# Patient Record
Sex: FEMALE | Race: WHITE | NOT HISPANIC OR LATINO | Employment: UNEMPLOYED | URBAN - METROPOLITAN AREA
[De-identification: names, ages, dates, MRNs, and addresses within clinical notes are randomized per-mention and may not be internally consistent; named-entity substitution may affect disease eponyms.]

---

## 2021-02-11 ENCOUNTER — HISTORICAL (OUTPATIENT)
Dept: BARIATRICS | Facility: HOSPITAL | Age: 46
End: 2021-02-11

## 2021-02-11 ENCOUNTER — HISTORICAL (OUTPATIENT)
Dept: PREADMISSION TESTING | Facility: HOSPITAL | Age: 46
End: 2021-02-11

## 2021-03-16 ENCOUNTER — HISTORICAL (OUTPATIENT)
Dept: BARIATRICS | Facility: HOSPITAL | Age: 46
End: 2021-03-16

## 2021-03-17 ENCOUNTER — HISTORICAL (OUTPATIENT)
Dept: ADMINISTRATIVE | Facility: HOSPITAL | Age: 46
End: 2021-03-17

## 2021-04-20 ENCOUNTER — HISTORICAL (OUTPATIENT)
Dept: BARIATRICS | Facility: HOSPITAL | Age: 46
End: 2021-04-20

## 2021-05-19 ENCOUNTER — HISTORICAL (OUTPATIENT)
Dept: BARIATRICS | Facility: HOSPITAL | Age: 46
End: 2021-05-19

## 2021-07-06 ENCOUNTER — HISTORICAL (OUTPATIENT)
Dept: BARIATRICS | Facility: HOSPITAL | Age: 46
End: 2021-07-06

## 2021-07-20 ENCOUNTER — HISTORICAL (OUTPATIENT)
Dept: BARIATRICS | Facility: HOSPITAL | Age: 46
End: 2021-07-20

## 2021-07-26 ENCOUNTER — HISTORICAL (OUTPATIENT)
Dept: ADMINISTRATIVE | Facility: HOSPITAL | Age: 46
End: 2021-07-26

## 2021-07-26 LAB
ABS NEUT (OLG): 3.4 X10(3)/MCL (ref 2.1–9.2)
ALBUMIN SERPL-MCNC: 4.2 GM/DL (ref 3.5–5)
ALBUMIN/GLOB SERPL: 1.4 RATIO (ref 1.1–2)
ALP SERPL-CCNC: 60 UNIT/L (ref 40–150)
ALT SERPL-CCNC: 41 UNIT/L (ref 0–55)
APTT PPP: 31.4 SECOND(S) (ref 23.3–37)
AST SERPL-CCNC: 33 UNIT/L (ref 5–34)
BASOPHILS # BLD AUTO: 0 X10(3)/MCL (ref 0–0.2)
BASOPHILS NFR BLD AUTO: 0 %
BILIRUB SERPL-MCNC: 0.4 MG/DL
BILIRUBIN DIRECT+TOT PNL SERPL-MCNC: 0.2 MG/DL (ref 0–0.5)
BILIRUBIN DIRECT+TOT PNL SERPL-MCNC: 0.2 MG/DL (ref 0–0.8)
BUN SERPL-MCNC: 8.6 MG/DL (ref 7–18.7)
CALCIUM SERPL-MCNC: 9.5 MG/DL (ref 8.4–10.2)
CHLORIDE SERPL-SCNC: 103 MMOL/L (ref 98–107)
CO2 SERPL-SCNC: 28 MMOL/L (ref 22–29)
CREAT SERPL-MCNC: 0.82 MG/DL (ref 0.55–1.02)
EOSINOPHIL # BLD AUTO: 0.2 X10(3)/MCL (ref 0–0.9)
EOSINOPHIL NFR BLD AUTO: 3 %
ERYTHROCYTE [DISTWIDTH] IN BLOOD BY AUTOMATED COUNT: 16.2 % (ref 11.5–14.5)
GLOBULIN SER-MCNC: 3 GM/DL (ref 2.4–3.5)
GLUCOSE SERPL-MCNC: 78 MG/DL (ref 74–100)
HCT VFR BLD AUTO: 39.9 % (ref 35–46)
HGB BLD-MCNC: 12.6 GM/DL (ref 12–16)
IMM GRANULOCYTES # BLD AUTO: 0.01 10*3/UL
IMM GRANULOCYTES NFR BLD AUTO: 0 %
LYMPHOCYTES # BLD AUTO: 2.2 X10(3)/MCL (ref 0.6–4.6)
LYMPHOCYTES NFR BLD AUTO: 36 %
MCH RBC QN AUTO: 27 PG (ref 26–34)
MCHC RBC AUTO-ENTMCNC: 31.6 GM/DL (ref 31–37)
MCV RBC AUTO: 85.6 FL (ref 80–100)
MONOCYTES # BLD AUTO: 0.5 X10(3)/MCL (ref 0.1–1.3)
MONOCYTES NFR BLD AUTO: 7 %
NEUTROPHILS # BLD AUTO: 3.4 X10(3)/MCL (ref 2.1–9.2)
NEUTROPHILS NFR BLD AUTO: 54 %
NRBC BLD AUTO-RTO: 0 % (ref 0–0.2)
PLATELET # BLD AUTO: 275 X10(3)/MCL (ref 130–400)
PMV BLD AUTO: 10.2 FL (ref 7.4–10.4)
POTASSIUM SERPL-SCNC: 4.1 MMOL/L (ref 3.5–5.1)
PROT SERPL-MCNC: 7.2 GM/DL (ref 6.4–8.3)
RBC # BLD AUTO: 4.66 X10(6)/MCL (ref 4–5.2)
SODIUM SERPL-SCNC: 138 MMOL/L (ref 136–145)
WBC # SPEC AUTO: 6.3 X10(3)/MCL (ref 4.5–11)

## 2021-10-18 RX ORDER — OMEPRAZOLE 40 MG/1
40 CAPSULE, DELAYED RELEASE ORAL
COMMUNITY
End: 2022-07-15

## 2021-10-18 RX ORDER — LANOLIN ALCOHOL/MO/W.PET/CERES
CREAM (GRAM) TOPICAL
COMMUNITY
End: 2022-07-15

## 2021-10-18 RX ORDER — TAMSULOSIN HYDROCHLORIDE 0.4 MG/1
1 CAPSULE ORAL DAILY
COMMUNITY
Start: 2021-04-29 | End: 2022-07-15

## 2021-10-18 RX ORDER — KETOROLAC TROMETHAMINE 10 MG/1
10 TABLET, FILM COATED ORAL EVERY 6 HOURS PRN
COMMUNITY
Start: 2021-04-29 | End: 2022-07-15

## 2021-10-18 RX ORDER — CEFUROXIME AXETIL 250 MG/1
250 TABLET ORAL
COMMUNITY
End: 2022-07-11 | Stop reason: ALTCHOICE

## 2021-10-18 RX ORDER — FUROSEMIDE 20 MG/1
TABLET ORAL
COMMUNITY
End: 2022-07-15

## 2021-10-18 RX ORDER — FLUCONAZOLE 150 MG/1
TABLET ORAL
COMMUNITY
Start: 2021-07-15 | End: 2022-07-15

## 2021-10-18 RX ORDER — LEVOTHYROXINE SODIUM 88 UG/1
TABLET ORAL
Status: ON HOLD | COMMUNITY
End: 2022-07-27 | Stop reason: CLARIF

## 2021-10-18 RX ORDER — ONDANSETRON 4 MG/1
4 TABLET, ORALLY DISINTEGRATING ORAL EVERY 8 HOURS PRN
COMMUNITY
Start: 2021-04-29 | End: 2022-07-15

## 2021-10-18 RX ORDER — PREDNISONE 20 MG/1
40 TABLET ORAL DAILY
COMMUNITY
Start: 2021-08-17 | End: 2022-07-11 | Stop reason: ALTCHOICE

## 2021-10-18 RX ORDER — MEDROXYPROGESTERONE ACETATE 10 MG/1
10 TABLET ORAL
COMMUNITY
End: 2022-05-10 | Stop reason: ALTCHOICE

## 2021-10-18 RX ORDER — HYDROCODONE BITARTRATE AND ACETAMINOPHEN 7.5; 325 MG/1; MG/1
1 TABLET ORAL EVERY 6 HOURS PRN
COMMUNITY
Start: 2021-04-29 | End: 2022-07-15

## 2021-10-18 RX ORDER — CHOLECALCIFEROL (VITAMIN D3) 325 MCG
CAPSULE ORAL
COMMUNITY
End: 2022-07-15

## 2021-10-18 RX ORDER — LIFITEGRAST 50 MG/ML
SOLUTION/ DROPS OPHTHALMIC
COMMUNITY
Start: 2021-07-13 | End: 2022-07-15

## 2022-05-19 ENCOUNTER — TELEPHONE (OUTPATIENT)
Dept: BARIATRICS | Facility: HOSPITAL | Age: 47
End: 2022-05-19
Payer: MEDICAID

## 2022-05-19 NOTE — TELEPHONE ENCOUNTER
Called pt to discuss diet plan moving forward with post ponned surgery, left am message for her to call me back.

## 2022-06-28 ENCOUNTER — CLINICAL SUPPORT (OUTPATIENT)
Dept: BARIATRICS | Facility: HOSPITAL | Age: 47
End: 2022-06-28
Payer: COMMERCIAL

## 2022-06-28 NOTE — PROGRESS NOTES
Date of education: 06/28/2022  Pt education type: [x]Pre op  []Post op  Surgery date: 07/27/2022    Type of surgery: sleeve gastrectomy     Education was provided on:   [x]Importance of protein and vitamin protocol  [x]Importance of drinking  fl oz/day of non carbonated sugar free non caffeinated beverages  [x]Importance of following dietary protocol  []Importance of early ambulation postop   []Use of incentive spirometer 10 times an hour while awake  []Non opiod pain management post op   []Discontinuing use of meds containing aspirin, ibuprofen, NSAIDs, post op  []Signs and symptoms of immediate and long term complications post-op  []Prevention and signs and symptoms of blood clots   []Prevention and signs of infection  []Reviewed medication regimen  []Importance of adhering to behavioral changes  []Importance of following exercise protocol      Barriers to learning:  [x]None evident  []Acuity of illness  []Cognitive defects  []Cultural barriers  []Desire/Motivation  []Difficulty concentrating  []Emotional state  []Financial concerns  []Hearing deficit  []Language barrier  []Literacy  []Memory problems  []Vision impairment     Teaching methods:  []Demonstration  [x]Explanation  [x]Printed materials  [x]Teach back  []Virtual/web based    Expected Compliance:  [x]Good  []Fair  []Poor    Additional Notes:

## 2022-06-28 NOTE — PROGRESS NOTES
PreOp Class 6/28/2022      Surgery Date: 7/27/22     Type of Surgery: VSG     Pt was educated on   Importance of early ambulation postop within 4 hours of returning to room   Use of incentive spirometer 10 times an hour while awake  Non opiod pain management post op   Discontinuing use of meds containing aspirin, ibuprofen, NSAIDs, post op  Signs and symptoms of immediate and long term complications post-op  Prevention and signs and symptoms of blood clots   Prevention and signs of infection  Reviewed medication regimen     Pt was interactive, answered questions during in person education session.

## 2022-06-29 NOTE — PROGRESS NOTES
Date of education: 6/28/22  Pt education type: [x]Pre op  []Post op  Surgery date: 7/27/22  Type of surgery: sleeve gastrectomy     Education was provided on:   []Importance of protein and vitamin protocol  []Importance of drinking  fl oz/day of non carbonated sugar free non caffeinated beverages  []Importance of following dietary protocol  []Importance of early ambulation postop   []Use of incentive spirometer 10 times an hour while awake  []Non opiod pain management post op   []Discontinuing use of meds containing aspirin, ibuprofen, NSAIDs, post op  []Signs and symptoms of immediate and long term complications post-op  []Prevention and signs and symptoms of blood clots   []Prevention and signs of infection  []Reviewed medication regimen  [x]Importance of adhering to behavioral changes  [x]Importance of following exercise protocol      Barriers to learning:  [x]None evident  []Acuity of illness  []Cognitive defects  []Cultural barriers  []Desire/Motivation  []Difficulty concentrating  []Emotional state  []Financial concerns  []Hearing deficit  []Language barrier  []Literacy  []Memory problems  []Vision impairment     Teaching methods:  [x]Demonstration  [x]Explanation  [x]Printed materials  [x]Teach back  []Virtual/web based    Expected Compliance:  [x]Good  []Fair  []Poor    Additional Notes: A body composition and measurements were conducted. Patient was given a copy of handout.

## 2022-07-11 ENCOUNTER — ANESTHESIA EVENT (OUTPATIENT)
Dept: SURGERY | Facility: HOSPITAL | Age: 47
DRG: 621 | End: 2022-07-11
Payer: MEDICAID

## 2022-07-11 DIAGNOSIS — Z01.818 OTHER SPECIFIED PRE-OPERATIVE EXAMINATION: Primary | ICD-10-CM

## 2022-07-11 NOTE — ANESTHESIA PREPROCEDURE EVALUATION
07/11/2022  Lisette Ervin is a 46 y.o., female with PMHx of morbid obesity, JERMAINE, GERD, hypothyroidism (no meds; pt states that her blood work has been fine without meds for years), anxiety presents for laparoscopic gastric sleeve.    COVID STATUS: 7/2020 COVID +; VACCINE X2 (MODERNA, LASTLY 5/10/21);  6/14/22 COVID +,  STREP + (6/14/22 PCP w/SORE THROAT, COUGH, SINUS CONGESTION, FATIGUE; GIVEN IM ROCEPHIN, OMNICEF x 10 DAYS); NO NEED for TESTING GIVEN COVID + w/in LAST 90 DAYS    BETA-BLOCKER: NONE    PAT NURSE PHONE INTERVIEW 7/15/22    PROBLEM LIST:  -  MORBID OBESITY      - S/P 3/17/21 EGD  -  UPT STATUS, AUB (PENDING HYSTERECTOMY)  -  HYPERPROLACTINEMIA - 6/6/22 PROLACTIN=39.5  -  HYPOTHYROIDISM (PREV. On SYNTHROID) - NO TFT's on FILE  -  GERD (PREV. On OMEPRAZOLE) - ASYMPTOMATIC  -  JERMAINE - NO Tx  -  ANXIETY (NO MEDs)  -  7/26/21 GFR=80  -  LUMBAR DISC DISEASE, OA  -  Hx 10/2021 FALL/CONCUSSION w/CHRONIC POST-TRAUMA TAYLOR's, VERTIGO - STATES HAS RESOLVED  -  7/15/22 EKG=NSR w/SINUS ARRHYTHMIA, LOW VOLTAGE QRS, CANNOT R/O ANTERIOR INFRACT ?AGE (DENIES ANY CP, METS>4)    AM Rx DOS: GABAPENTIN, PROVERA    ORDERS -   SURGEON: 7/26/21 CBC, CMP, PTT; per SURGEON: 7/15/22 CBC, CMP, PTT  ANESTHESIA: ADD: 7/15/22 EKG; UPT, T&S DOS      Pre-op Assessment    I have reviewed the NPO Status.      Review of Systems  Anesthesia Hx:  No problems with previous Anesthesia    Social:  Non-Smoker    Cardiovascular:  Cardiovascular Normal     Pulmonary:   Sleep Apnea    Renal/:  Renal/ Normal     Hepatic/GI:   GERD, well controlled    Neurological:  Neurology Normal    Endocrine:   Hypothyroidism    Psych:   anxiety        Vitals:    07/27/22 0615 07/27/22 0653 07/27/22 0746 07/27/22 0814   BP: (!) 151/86  (!) 151/86 126/78   BP Location:    Right arm   Patient Position:    Lying   Pulse: 69   78   Resp:    20   Temp: 37.1 °C  (98.8 °F)   36.2 °C (97.2 °F)   TempSrc: Oral   Temporal   SpO2: 96%   100%   Weight:  125.4 kg (276 lb 7.3 oz)           Physical Exam  General: Alert, Cooperative and Well nourished    Airway:  Mallampati: III   Mouth Opening: Normal  TM Distance: Normal  Tongue: Normal  Neck ROM: Normal ROM    Dental:  Intact    Chest/Lungs:  Clear to auscultation, Normal Respiratory Rate    Heart:  Rate: Normal  Rhythm: Regular Rhythm  Sounds: Normal       Latest Reference Range & Units 07/27/22 06:56   Preg Test, Ur Negative  Negative     Lab Results   Component Value Date    WBC 8.0 07/15/2022    HGB 10.0 (L) 07/15/2022    HCT 34.2 (L) 07/15/2022    MCV 78.3 (L) 07/15/2022     07/15/2022       CMP  Sodium Level   Date Value Ref Range Status   07/15/2022 140 136 - 145 mmol/L Final     Potassium Level   Date Value Ref Range Status   07/15/2022 3.9 3.5 - 5.1 mmol/L Final     Carbon Dioxide   Date Value Ref Range Status   07/15/2022 24 22 - 29 mmol/L Final     Blood Urea Nitrogen   Date Value Ref Range Status   07/15/2022 11.5 7.0 - 18.7 mg/dL Final     Creatinine   Date Value Ref Range Status   07/15/2022 0.80 0.55 - 1.02 mg/dL Final     Calcium Level Total   Date Value Ref Range Status   07/15/2022 10.1 8.4 - 10.2 mg/dL Final     Albumin Level   Date Value Ref Range Status   07/15/2022 4.4 3.5 - 5.0 gm/dL Final     Bilirubin Total   Date Value Ref Range Status   07/15/2022 0.5 <=1.5 mg/dL Final     Alkaline Phosphatase   Date Value Ref Range Status   07/15/2022 55 40 - 150 unit/L Final     Aspartate Aminotransferase   Date Value Ref Range Status   07/15/2022 31 5 - 34 unit/L Final     Alanine Aminotransferase   Date Value Ref Range Status   07/15/2022 24 0 - 55 unit/L Final     Estimated GFR-Non    Date Value Ref Range Status   07/15/2022 >60 mls/min/1.73/m2 Final             Anesthesia Plan  Type of Anesthesia, risks & benefits discussed:    Anesthesia Type: Gen ETT  Intra-op Monitoring Plan: Standard ASA  Monitors  Post Op Pain Control Plan: multimodal analgesia  Induction:  IV  Airway Plan: Direct  Informed Consent: Informed consent signed with the Patient and all parties understand the risks and agree with anesthesia plan.  All questions answered.   ASA Score: 2  Day of Surgery Review of History & Physical: H&P Update referred to the surgeon/provider.    Ready For Surgery From Anesthesia Perspective.     .

## 2022-07-15 ENCOUNTER — CLINICAL SUPPORT (OUTPATIENT)
Dept: BARIATRICS | Facility: HOSPITAL | Age: 47
End: 2022-07-15

## 2022-07-15 ENCOUNTER — APPOINTMENT (OUTPATIENT)
Dept: PREADMISSION TESTING | Facility: HOSPITAL | Age: 47
End: 2022-07-15
Attending: SURGERY

## 2022-07-15 ENCOUNTER — CLINICAL SUPPORT (OUTPATIENT)
Dept: BARIATRICS | Facility: HOSPITAL | Age: 47
End: 2022-07-15
Payer: MEDICAID

## 2022-07-15 VITALS — BODY MASS INDEX: 43.73 KG/M2 | WEIGHT: 279.19 LBS

## 2022-07-15 LAB
ALBUMIN SERPL-MCNC: 4.4 GM/DL (ref 3.5–5)
ALBUMIN/GLOB SERPL: 1.3 RATIO (ref 1.1–2)
ALP SERPL-CCNC: 55 UNIT/L (ref 40–150)
ALT SERPL-CCNC: 24 UNIT/L (ref 0–55)
APTT PPP: 30.6 SECONDS
AST SERPL-CCNC: 31 UNIT/L (ref 5–34)
BASOPHILS # BLD AUTO: 0.03 X10(3)/MCL (ref 0–0.2)
BASOPHILS NFR BLD AUTO: 0.4 %
BILIRUBIN DIRECT+TOT PNL SERPL-MCNC: 0.5 MG/DL
BUN SERPL-MCNC: 11.5 MG/DL (ref 7–18.7)
CALCIUM SERPL-MCNC: 10.1 MG/DL (ref 8.4–10.2)
CHLORIDE SERPL-SCNC: 105 MMOL/L (ref 98–107)
CO2 SERPL-SCNC: 24 MMOL/L (ref 22–29)
CREAT SERPL-MCNC: 0.8 MG/DL (ref 0.55–1.02)
EOSINOPHIL # BLD AUTO: 0.08 X10(3)/MCL (ref 0–0.9)
EOSINOPHIL NFR BLD AUTO: 1 %
ERYTHROCYTE [DISTWIDTH] IN BLOOD BY AUTOMATED COUNT: 16.1 % (ref 11.5–17)
GLOBULIN SER-MCNC: 3.3 GM/DL (ref 2.4–3.5)
GLUCOSE SERPL-MCNC: 85 MG/DL (ref 74–100)
HCT VFR BLD AUTO: 34.2 % (ref 37–47)
HGB BLD-MCNC: 10 GM/DL (ref 12–16)
IMM GRANULOCYTES # BLD AUTO: 0.03 X10(3)/MCL (ref 0–0.04)
IMM GRANULOCYTES NFR BLD AUTO: 0.4 %
LYMPHOCYTES # BLD AUTO: 2.92 X10(3)/MCL (ref 0.6–4.6)
LYMPHOCYTES NFR BLD AUTO: 36.5 %
MCH RBC QN AUTO: 22.9 PG (ref 27–31)
MCHC RBC AUTO-ENTMCNC: 29.2 MG/DL (ref 33–36)
MCV RBC AUTO: 78.3 FL (ref 80–94)
MONOCYTES # BLD AUTO: 0.52 X10(3)/MCL (ref 0.1–1.3)
MONOCYTES NFR BLD AUTO: 6.5 %
NEUTROPHILS # BLD AUTO: 4.4 X10(3)/MCL (ref 2.1–9.2)
NEUTROPHILS NFR BLD AUTO: 55.2 %
NRBC BLD AUTO-RTO: 0 %
PLATELET # BLD AUTO: 346 X10(3)/MCL (ref 130–400)
PMV BLD AUTO: 9.3 FL (ref 7.4–10.4)
POTASSIUM SERPL-SCNC: 3.9 MMOL/L (ref 3.5–5.1)
PROT SERPL-MCNC: 7.7 GM/DL (ref 6.4–8.3)
RBC # BLD AUTO: 4.37 X10(6)/MCL (ref 4.2–5.4)
SODIUM SERPL-SCNC: 140 MMOL/L (ref 136–145)
WBC # SPEC AUTO: 8 X10(3)/MCL (ref 4.5–11.5)

## 2022-07-15 PROCEDURE — 36415 COLL VENOUS BLD VENIPUNCTURE: CPT | Performed by: SURGERY

## 2022-07-15 PROCEDURE — 93005 ELECTROCARDIOGRAM TRACING: CPT

## 2022-07-15 PROCEDURE — 80053 COMPREHEN METABOLIC PANEL: CPT | Performed by: SURGERY

## 2022-07-15 PROCEDURE — 85025 COMPLETE CBC W/AUTO DIFF WBC: CPT | Performed by: SURGERY

## 2022-07-15 PROCEDURE — 85730 THROMBOPLASTIN TIME PARTIAL: CPT | Performed by: SURGERY

## 2022-07-15 NOTE — PROGRESS NOTES
Pt here for 2 week pre op reviwed, reviewed pre op diet, T/F test and pre op questions.   Pt doing well on the pre op diet, lost 8#   Current weight at our office 279#    Pt saw PCP on 7/6- current wt there was 287# will use this to compare 1 week pre op weight with PCP.

## 2022-07-15 NOTE — PROGRESS NOTES
Patient attended preop education visit with team. Patient missed 1 on questions 13-18. Corrections were discussed. No issues noted.     ALONA Leblanc, CPT, CHC

## 2022-07-20 ENCOUNTER — CLINICAL SUPPORT (OUTPATIENT)
Dept: BARIATRICS | Facility: HOSPITAL | Age: 47
End: 2022-07-20
Payer: MEDICAID

## 2022-07-20 VITALS — WEIGHT: 279 LBS | BODY MASS INDEX: 43.7 KG/M2

## 2022-07-27 ENCOUNTER — ANESTHESIA (OUTPATIENT)
Dept: SURGERY | Facility: HOSPITAL | Age: 47
DRG: 621 | End: 2022-07-27
Payer: MEDICAID

## 2022-07-27 ENCOUNTER — HOSPITAL ENCOUNTER (INPATIENT)
Facility: HOSPITAL | Age: 47
LOS: 1 days | Discharge: HOME | DRG: 621 | End: 2022-07-28
Attending: SURGERY | Admitting: SURGERY
Payer: MEDICAID

## 2022-07-27 DIAGNOSIS — E66.01 MORBID OBESITY WITH BMI OF 40.0-44.9, ADULT: Primary | Chronic | ICD-10-CM

## 2022-07-27 DIAGNOSIS — Z01.818 OTHER SPECIFIED PRE-OPERATIVE EXAMINATION: ICD-10-CM

## 2022-07-27 DIAGNOSIS — E66.01 MORBID OBESITY: ICD-10-CM

## 2022-07-27 DIAGNOSIS — Z90.3 S/P GASTRIC SLEEVE PROCEDURE: ICD-10-CM

## 2022-07-27 PROBLEM — K44.9 HIATAL HERNIA: Status: ACTIVE | Noted: 2022-07-27

## 2022-07-27 LAB
ABORH RETYPE: NORMAL
B-HCG UR QL: NEGATIVE
CTP QC/QA: YES
GROUP & RH: NORMAL
INDIRECT COOMBS GEL: NORMAL
POCT GLUCOSE: 133 MG/DL (ref 70–110)

## 2022-07-27 PROCEDURE — 37000009 HC ANESTHESIA EA ADD 15 MINS: Performed by: SURGERY

## 2022-07-27 PROCEDURE — 63600175 PHARM REV CODE 636 W HCPCS: Performed by: NURSE PRACTITIONER

## 2022-07-27 PROCEDURE — 63600175 PHARM REV CODE 636 W HCPCS: Performed by: ANESTHESIOLOGY

## 2022-07-27 PROCEDURE — 36415 COLL VENOUS BLD VENIPUNCTURE: CPT

## 2022-07-27 PROCEDURE — 63600175 PHARM REV CODE 636 W HCPCS: Performed by: NURSE ANESTHETIST, CERTIFIED REGISTERED

## 2022-07-27 PROCEDURE — 71000033 HC RECOVERY, INTIAL HOUR: Performed by: SURGERY

## 2022-07-27 PROCEDURE — 25000003 PHARM REV CODE 250: Performed by: NURSE ANESTHETIST, CERTIFIED REGISTERED

## 2022-07-27 PROCEDURE — 25000003 PHARM REV CODE 250

## 2022-07-27 PROCEDURE — 86850 RBC ANTIBODY SCREEN: CPT | Performed by: NURSE PRACTITIONER

## 2022-07-27 PROCEDURE — 37000008 HC ANESTHESIA 1ST 15 MINUTES: Performed by: SURGERY

## 2022-07-27 PROCEDURE — 25000003 PHARM REV CODE 250: Performed by: NURSE PRACTITIONER

## 2022-07-27 PROCEDURE — 11000001 HC ACUTE MED/SURG PRIVATE ROOM

## 2022-07-27 PROCEDURE — 36000711: Performed by: SURGERY

## 2022-07-27 PROCEDURE — 81025 URINE PREGNANCY TEST: CPT | Performed by: NURSE PRACTITIONER

## 2022-07-27 PROCEDURE — 86900 BLOOD TYPING SEROLOGIC ABO: CPT | Performed by: NURSE PRACTITIONER

## 2022-07-27 PROCEDURE — 36000710: Performed by: SURGERY

## 2022-07-27 PROCEDURE — 27201423 OPTIME MED/SURG SUP & DEVICES STERILE SUPPLY: Performed by: SURGERY

## 2022-07-27 PROCEDURE — 63600175 PHARM REV CODE 636 W HCPCS

## 2022-07-27 RX ORDER — MIDAZOLAM HYDROCHLORIDE 1 MG/ML
2 INJECTION INTRAMUSCULAR; INTRAVENOUS ONCE AS NEEDED
Status: COMPLETED | OUTPATIENT
Start: 2022-07-27 | End: 2022-07-27

## 2022-07-27 RX ORDER — ONDANSETRON 2 MG/ML
4 INJECTION INTRAMUSCULAR; INTRAVENOUS EVERY 4 HOURS PRN
Status: ACTIVE | OUTPATIENT
Start: 2022-07-27

## 2022-07-27 RX ORDER — MORPHINE SULFATE 2 MG/ML
INJECTION, SOLUTION INTRAMUSCULAR; INTRAVENOUS
Status: COMPLETED
Start: 2022-07-27 | End: 2022-07-27

## 2022-07-27 RX ORDER — ONDANSETRON 4 MG/1
4 TABLET, ORALLY DISINTEGRATING ORAL EVERY 6 HOURS PRN
Status: ACTIVE | OUTPATIENT
Start: 2022-07-27

## 2022-07-27 RX ORDER — MEDROXYPROGESTERONE ACETATE 10 MG/1
20 TABLET ORAL DAILY
Status: DISCONTINUED | OUTPATIENT
Start: 2022-07-28 | End: 2022-07-28 | Stop reason: HOSPADM

## 2022-07-27 RX ORDER — ONDANSETRON 4 MG/1
4 TABLET, ORALLY DISINTEGRATING ORAL EVERY 6 HOURS PRN
Qty: 12 TABLET | Refills: 1 | Status: SHIPPED | OUTPATIENT
Start: 2022-07-27

## 2022-07-27 RX ORDER — CEFAZOLIN SODIUM 1 G/3ML
INJECTION, POWDER, FOR SOLUTION INTRAMUSCULAR; INTRAVENOUS
Status: DISCONTINUED | OUTPATIENT
Start: 2022-07-27 | End: 2022-07-27

## 2022-07-27 RX ORDER — SODIUM CHLORIDE, SODIUM LACTATE, POTASSIUM CHLORIDE, CALCIUM CHLORIDE 600; 310; 30; 20 MG/100ML; MG/100ML; MG/100ML; MG/100ML
INJECTION, SOLUTION INTRAVENOUS CONTINUOUS
Status: DISCONTINUED | OUTPATIENT
Start: 2022-07-27 | End: 2022-07-28

## 2022-07-27 RX ORDER — PROMETHAZINE HYDROCHLORIDE 12.5 MG/1
12.5 TABLET ORAL EVERY 6 HOURS PRN
Qty: 12 TABLET | Refills: 1 | Status: SHIPPED | OUTPATIENT
Start: 2022-07-27

## 2022-07-27 RX ORDER — ROCURONIUM BROMIDE 10 MG/ML
INJECTION, SOLUTION INTRAVENOUS
Status: DISCONTINUED | OUTPATIENT
Start: 2022-07-27 | End: 2022-07-27

## 2022-07-27 RX ORDER — PANTOPRAZOLE SODIUM 40 MG/1
40 TABLET, DELAYED RELEASE ORAL DAILY
Status: DISPENSED | OUTPATIENT
Start: 2022-07-27

## 2022-07-27 RX ORDER — BUPIVACAINE HYDROCHLORIDE 2.5 MG/ML
INJECTION, SOLUTION EPIDURAL; INFILTRATION; INTRACAUDAL
Status: DISCONTINUED | OUTPATIENT
Start: 2022-07-27 | End: 2022-07-27 | Stop reason: HOSPADM

## 2022-07-27 RX ORDER — MIDAZOLAM HYDROCHLORIDE 1 MG/ML
INJECTION INTRAMUSCULAR; INTRAVENOUS
Status: DISPENSED
Start: 2022-07-27 | End: 2022-07-27

## 2022-07-27 RX ORDER — MORPHINE SULFATE 2 MG/ML
INJECTION, SOLUTION INTRAMUSCULAR; INTRAVENOUS
Status: DISPENSED
Start: 2022-07-27 | End: 2022-07-27

## 2022-07-27 RX ORDER — LABETALOL HCL 20 MG/4 ML
SYRINGE (ML) INTRAVENOUS
Status: DISPENSED
Start: 2022-07-27 | End: 2022-07-27

## 2022-07-27 RX ORDER — LABETALOL HYDROCHLORIDE 5 MG/ML
10 INJECTION, SOLUTION INTRAVENOUS
Status: COMPLETED | OUTPATIENT
Start: 2022-07-27 | End: 2022-07-27

## 2022-07-27 RX ORDER — GABAPENTIN 300 MG/1
600 CAPSULE ORAL
Status: COMPLETED | OUTPATIENT
Start: 2022-07-27 | End: 2022-07-27

## 2022-07-27 RX ORDER — CLONIDINE 0.1 MG/24H
1 PATCH, EXTENDED RELEASE TRANSDERMAL ONCE AS NEEDED
Status: DISPENSED | OUTPATIENT
Start: 2022-07-27 | End: 2033-12-22

## 2022-07-27 RX ORDER — PROPOFOL 10 MG/ML
VIAL (ML) INTRAVENOUS
Status: DISCONTINUED | OUTPATIENT
Start: 2022-07-27 | End: 2022-07-27

## 2022-07-27 RX ORDER — MEPERIDINE HYDROCHLORIDE 25 MG/ML
12.5 INJECTION INTRAMUSCULAR; INTRAVENOUS; SUBCUTANEOUS EVERY 10 MIN PRN
Status: DISCONTINUED | OUTPATIENT
Start: 2022-07-27 | End: 2022-07-27

## 2022-07-27 RX ORDER — ONDANSETRON 2 MG/ML
4 INJECTION INTRAMUSCULAR; INTRAVENOUS DAILY PRN
Status: DISCONTINUED | OUTPATIENT
Start: 2022-07-27 | End: 2022-07-27

## 2022-07-27 RX ORDER — ENOXAPARIN SODIUM 100 MG/ML
40 INJECTION SUBCUTANEOUS
Status: COMPLETED | OUTPATIENT
Start: 2022-07-27 | End: 2022-07-27

## 2022-07-27 RX ORDER — ONDANSETRON 2 MG/ML
INJECTION INTRAMUSCULAR; INTRAVENOUS
Status: DISCONTINUED | OUTPATIENT
Start: 2022-07-27 | End: 2022-07-27

## 2022-07-27 RX ORDER — LIDOCAINE HYDROCHLORIDE 10 MG/ML
1 INJECTION, SOLUTION EPIDURAL; INFILTRATION; INTRACAUDAL; PERINEURAL ONCE
Status: ACTIVE | OUTPATIENT
Start: 2022-07-27

## 2022-07-27 RX ORDER — CELECOXIB 200 MG/1
200 CAPSULE ORAL
Status: COMPLETED | OUTPATIENT
Start: 2022-07-27 | End: 2022-07-27

## 2022-07-27 RX ORDER — DEXAMETHASONE SODIUM PHOSPHATE 4 MG/ML
INJECTION, SOLUTION INTRA-ARTICULAR; INTRALESIONAL; INTRAMUSCULAR; INTRAVENOUS; SOFT TISSUE
Status: DISCONTINUED | OUTPATIENT
Start: 2022-07-27 | End: 2022-07-27

## 2022-07-27 RX ORDER — LABETALOL HYDROCHLORIDE 5 MG/ML
INJECTION, SOLUTION INTRAVENOUS
Status: DISCONTINUED | OUTPATIENT
Start: 2022-07-27 | End: 2022-07-27

## 2022-07-27 RX ORDER — BUPIVACAINE HYDROCHLORIDE 2.5 MG/ML
INJECTION, SOLUTION EPIDURAL; INFILTRATION; INTRACAUDAL
Status: DISPENSED
Start: 2022-07-27 | End: 2022-07-27

## 2022-07-27 RX ORDER — KETOROLAC TROMETHAMINE 30 MG/ML
INJECTION, SOLUTION INTRAMUSCULAR; INTRAVENOUS
Status: COMPLETED
Start: 2022-07-27 | End: 2022-07-27

## 2022-07-27 RX ORDER — PANTOPRAZOLE SODIUM 40 MG/1
40 TABLET, DELAYED RELEASE ORAL DAILY
Qty: 30 TABLET | Refills: 2 | Status: SHIPPED | OUTPATIENT
Start: 2022-07-27

## 2022-07-27 RX ORDER — FENTANYL CITRATE 50 UG/ML
INJECTION, SOLUTION INTRAMUSCULAR; INTRAVENOUS
Status: DISCONTINUED | OUTPATIENT
Start: 2022-07-27 | End: 2022-07-27

## 2022-07-27 RX ORDER — ACETAMINOPHEN 160 MG/5ML
1000 SOLUTION ORAL
Status: COMPLETED | OUTPATIENT
Start: 2022-07-27 | End: 2022-07-27

## 2022-07-27 RX ORDER — ACETAMINOPHEN 500 MG
1000 TABLET ORAL EVERY 8 HOURS
Status: DISCONTINUED | OUTPATIENT
Start: 2022-07-28 | End: 2022-07-28

## 2022-07-27 RX ORDER — ENOXAPARIN SODIUM 100 MG/ML
40 INJECTION SUBCUTANEOUS DAILY
Status: DISCONTINUED | OUTPATIENT
Start: 2022-07-28 | End: 2022-07-28 | Stop reason: HOSPADM

## 2022-07-27 RX ORDER — MEPERIDINE HYDROCHLORIDE 25 MG/ML
INJECTION INTRAMUSCULAR; INTRAVENOUS; SUBCUTANEOUS
Status: DISPENSED
Start: 2022-07-27 | End: 2022-07-27

## 2022-07-27 RX ORDER — ENOXAPARIN SODIUM 100 MG/ML
40 INJECTION SUBCUTANEOUS EVERY 24 HOURS
Status: DISCONTINUED | OUTPATIENT
Start: 2022-07-28 | End: 2022-07-27

## 2022-07-27 RX ORDER — GABAPENTIN 300 MG/1
300 CAPSULE ORAL 2 TIMES DAILY
Status: DISCONTINUED | OUTPATIENT
Start: 2022-07-28 | End: 2022-07-28 | Stop reason: HOSPADM

## 2022-07-27 RX ORDER — ONDANSETRON 4 MG/1
4 TABLET, ORALLY DISINTEGRATING ORAL
Status: COMPLETED | OUTPATIENT
Start: 2022-07-27 | End: 2022-07-27

## 2022-07-27 RX ORDER — KETOROLAC TROMETHAMINE 10 MG/1
10 TABLET, FILM COATED ORAL EVERY 6 HOURS PRN
Qty: 12 TABLET | Refills: 0 | Status: SHIPPED | OUTPATIENT
Start: 2022-07-27 | End: 2022-07-30

## 2022-07-27 RX ORDER — PROMETHAZINE HYDROCHLORIDE 25 MG/ML
12.5 INJECTION, SOLUTION INTRAMUSCULAR; INTRAVENOUS
Status: ACTIVE | OUTPATIENT
Start: 2022-07-27

## 2022-07-27 RX ORDER — CEFAZOLIN SODIUM 2 G/50ML
2 SOLUTION INTRAVENOUS
Status: ACTIVE | OUTPATIENT
Start: 2022-07-27

## 2022-07-27 RX ORDER — ACETAMINOPHEN 500 MG
1000 TABLET ORAL EVERY 6 HOURS PRN
Qty: 50 TABLET | Refills: 0 | Status: SHIPPED | OUTPATIENT
Start: 2022-07-27

## 2022-07-27 RX ORDER — PHENYLEPHRINE HYDROCHLORIDE 10 MG/ML
INJECTION INTRAVENOUS
Status: DISCONTINUED | OUTPATIENT
Start: 2022-07-27 | End: 2022-07-27

## 2022-07-27 RX ORDER — TRAMADOL HYDROCHLORIDE 50 MG/1
100 TABLET ORAL EVERY 6 HOURS PRN
Status: ACTIVE | OUTPATIENT
Start: 2022-07-27

## 2022-07-27 RX ORDER — OXYCODONE HYDROCHLORIDE 5 MG/1
5 TABLET ORAL
Status: DISCONTINUED | OUTPATIENT
Start: 2022-07-27 | End: 2022-07-28 | Stop reason: HOSPADM

## 2022-07-27 RX ORDER — KETOROLAC TROMETHAMINE 30 MG/ML
30 INJECTION, SOLUTION INTRAMUSCULAR; INTRAVENOUS EVERY 6 HOURS
Status: DISPENSED | OUTPATIENT
Start: 2022-07-27 | End: 2022-07-30

## 2022-07-27 RX ORDER — PROCHLORPERAZINE EDISYLATE 5 MG/ML
5 INJECTION INTRAMUSCULAR; INTRAVENOUS EVERY 6 HOURS PRN
Status: DISPENSED | OUTPATIENT
Start: 2022-07-27

## 2022-07-27 RX ORDER — LIDOCAINE HYDROCHLORIDE 20 MG/ML
INJECTION INTRAVENOUS
Status: DISCONTINUED | OUTPATIENT
Start: 2022-07-27 | End: 2022-07-27

## 2022-07-27 RX ORDER — ACETAMINOPHEN 10 MG/ML
1000 INJECTION, SOLUTION INTRAVENOUS EVERY 8 HOURS
Status: DISCONTINUED | OUTPATIENT
Start: 2022-07-27 | End: 2022-07-28

## 2022-07-27 RX ORDER — MORPHINE SULFATE 2 MG/ML
2 INJECTION, SOLUTION INTRAMUSCULAR; INTRAVENOUS EVERY 5 MIN PRN
Status: COMPLETED | OUTPATIENT
Start: 2022-07-27 | End: 2022-07-27

## 2022-07-27 RX ADMIN — GABAPENTIN 600 MG: 300 CAPSULE ORAL at 07:07

## 2022-07-27 RX ADMIN — PHENYLEPHRINE HYDROCHLORIDE 100 MCG: 10 INJECTION INTRAVENOUS at 09:07

## 2022-07-27 RX ADMIN — SODIUM CHLORIDE, POTASSIUM CHLORIDE, SODIUM LACTATE AND CALCIUM CHLORIDE: 600; 310; 30; 20 INJECTION, SOLUTION INTRAVENOUS at 09:07

## 2022-07-27 RX ADMIN — ACETAMINOPHEN 1001.6 MG: 160 SOLUTION ORAL at 07:07

## 2022-07-27 RX ADMIN — MIDAZOLAM HYDROCHLORIDE 2 MG: 1 INJECTION, SOLUTION INTRAMUSCULAR; INTRAVENOUS at 08:07

## 2022-07-27 RX ADMIN — SODIUM CHLORIDE, POTASSIUM CHLORIDE, SODIUM LACTATE AND CALCIUM CHLORIDE: 600; 310; 30; 20 INJECTION, SOLUTION INTRAVENOUS at 08:07

## 2022-07-27 RX ADMIN — LABETALOL HYDROCHLORIDE 10 MG: 5 INJECTION, SOLUTION INTRAVENOUS at 10:07

## 2022-07-27 RX ADMIN — CEFAZOLIN 2 G: 330 INJECTION, POWDER, FOR SOLUTION INTRAMUSCULAR; INTRAVENOUS at 09:07

## 2022-07-27 RX ADMIN — CELECOXIB 200 MG: 200 CAPSULE ORAL at 07:07

## 2022-07-27 RX ADMIN — LIDOCAINE HYDROCHLORIDE 50 MG: 20 INJECTION, SOLUTION INTRAVENOUS at 09:07

## 2022-07-27 RX ADMIN — MORPHINE SULFATE 2 MG: 2 INJECTION, SOLUTION INTRAMUSCULAR; INTRAVENOUS at 11:07

## 2022-07-27 RX ADMIN — DEXAMETHASONE SODIUM PHOSPHATE 8 MG: 4 INJECTION, SOLUTION INTRA-ARTICULAR; INTRALESIONAL; INTRAMUSCULAR; INTRAVENOUS; SOFT TISSUE at 09:07

## 2022-07-27 RX ADMIN — ROCURONIUM BROMIDE 50 MG: 10 SOLUTION INTRAVENOUS at 09:07

## 2022-07-27 RX ADMIN — PROPOFOL 150 MG: 10 INJECTION, EMULSION INTRAVENOUS at 09:07

## 2022-07-27 RX ADMIN — FENTANYL CITRATE 100 MCG: 50 INJECTION, SOLUTION INTRAMUSCULAR; INTRAVENOUS at 09:07

## 2022-07-27 RX ADMIN — SODIUM CHLORIDE 2 G: 9 INJECTION, SOLUTION INTRAVENOUS at 05:07

## 2022-07-27 RX ADMIN — MEPERIDINE HYDROCHLORIDE 12.5 MG: 25 INJECTION INTRAMUSCULAR; INTRAVENOUS; SUBCUTANEOUS at 11:07

## 2022-07-27 RX ADMIN — LABETALOL HYDROCHLORIDE 10 MG: 5 INJECTION, SOLUTION INTRAVENOUS at 11:07

## 2022-07-27 RX ADMIN — KETOROLAC TROMETHAMINE 30 MG: 30 INJECTION, SOLUTION INTRAMUSCULAR at 11:07

## 2022-07-27 RX ADMIN — SODIUM CHLORIDE, POTASSIUM CHLORIDE, SODIUM LACTATE AND CALCIUM CHLORIDE: 600; 310; 30; 20 INJECTION, SOLUTION INTRAVENOUS at 02:07

## 2022-07-27 RX ADMIN — SUGAMMADEX 400 MG: 100 INJECTION, SOLUTION INTRAVENOUS at 10:07

## 2022-07-27 RX ADMIN — ACETAMINOPHEN 1000 MG: 10 INJECTION, SOLUTION INTRAVENOUS at 04:07

## 2022-07-27 RX ADMIN — ACETAMINOPHEN 1000 MG: 10 INJECTION, SOLUTION INTRAVENOUS at 10:07

## 2022-07-27 RX ADMIN — ONDANSETRON 4 MG: 2 INJECTION INTRAMUSCULAR; INTRAVENOUS at 10:07

## 2022-07-27 RX ADMIN — KETOROLAC TROMETHAMINE 30 MG: 30 INJECTION, SOLUTION INTRAMUSCULAR; INTRAVENOUS at 05:07

## 2022-07-27 RX ADMIN — LABETALOL HYDROCHLORIDE 5 MG: 5 INJECTION, SOLUTION INTRAVENOUS at 10:07

## 2022-07-27 RX ADMIN — ENOXAPARIN SODIUM 40 MG: 40 INJECTION SUBCUTANEOUS at 08:07

## 2022-07-27 RX ADMIN — KETOROLAC TROMETHAMINE 30 MG: 30 INJECTION, SOLUTION INTRAMUSCULAR; INTRAVENOUS at 11:07

## 2022-07-27 RX ADMIN — ONDANSETRON 4 MG: 4 TABLET, ORALLY DISINTEGRATING ORAL at 07:07

## 2022-07-27 RX ADMIN — SUGAMMADEX 10 MG: 100 INJECTION, SOLUTION INTRAVENOUS at 10:07

## 2022-07-27 NOTE — ANESTHESIA POSTPROCEDURE EVALUATION
Anesthesia Post Evaluation    Patient: Lisette Ervin    Procedure(s) Performed: Procedure(s) (LRB):  GASTRECTOMY, SLEEVE, LAPAROSCOPIC (N/A)  REPAIR, HERNIA, HIATAL, LAPAROSCOPIC (N/A)    Final Anesthesia Type: general      Patient location during evaluation: floor  Post-procedure vital signs: reviewed and stable  Airway patency: patent      Anesthetic complications: no      Cardiovascular status: hemodynamically stable  Respiratory status: spontaneous ventilation  Follow-up not needed.          Vitals Value Taken Time   /78 07/27/22 1140   Temp 36.6 °C (97.9 °F) 07/27/22 1140   Pulse 60 07/27/22 1140   Resp 18 07/27/22 1140   SpO2 97 % 07/27/22 1140         Event Time   Out of Recovery 11:45:00         Pain/Mohan Score: Pain Rating Prior to Med Admin: 8 (7/27/2022 11:33 AM)  Pain Rating Post Med Admin: 0 (7/27/2022 11:40 AM)  Mohan Score: 9 (7/27/2022 11:40 AM)

## 2022-07-27 NOTE — H&P
History & Physical    SUBJECTIVE:     History of Present Illness:  Patient is a 46 y.o. female presents to [facility] for elective sleeve gastrectomy. No changes to medical history. Pt ready to proceed.     No chief complaint on file.      Review of patient's allergies indicates:   Allergen Reactions    Ketamine Hallucinations     Other reaction(s): Anxiety    Doxycycline Nausea And Vomiting    Gatifloxacin Itching and Rash    Moxifloxacin Itching and Rash       Current Facility-Administered Medications   Medication Dose Route Frequency Provider Last Rate Last Admin    cefazolin (ANCEF) 2 gram in dextrose 5% 50 mL IVPB (premix)  2 g Intravenous On Call Procedure Mela E Teran, FNP        lactated ringers infusion   Intravenous Continuous Mela E Teran,  mL/hr at 07/27/22 0821 New Bag at 07/27/22 0821    lactated ringers infusion   Intravenous Continuous Muna Odell MD        LIDOcaine (PF) 10 mg/ml (1%) injection 10 mg  1 mL Intradermal Once Chantal Clancy, FNJOSE        midazolam (VERSED) 1 mg/mL injection                Past Medical History:   Diagnosis Date    Abnormal weight gain     Concussion     Hypothyroidism     Insulin resistance     Irritable bowel syndrome     Malaise and fatigue     Vitamin B 12 deficiency     Vitamin deficiency      Past Surgical History:   Procedure Laterality Date    CHOLECYSTECTOMY      CYSTOSCOPY      TONSILLECTOMY       Family History   Problem Relation Age of Onset    Diabetes type II Mother     Heart disease Father     Diabetes type II Father     Lung cancer Maternal Grandmother     Diabetes type II Maternal Grandmother     Lung cancer Maternal Grandfather     Stroke Paternal Grandmother     Heart attack Paternal Grandmother     Colon cancer Paternal Grandfather     Heart attack Paternal Grandfather     Throat cancer Other     Bladder Cancer Other     Colon cancer Other     Lung cancer Other      Social History     Tobacco Use     Smoking status: Never Smoker    Smokeless tobacco: Never Used   Substance Use Topics    Alcohol use: Not Currently     Alcohol/week: 2.0 standard drinks     Types: 1 Shots of liquor, 1 Cans of beer per week     Comment: occasionally    Drug use: Never        Review of Systems:   All ROS negative.     OBJECTIVE:     Vital Signs (Most Recent)  Temp: 97.2 °F (36.2 °C) (07/27/22 0814)  Pulse: 78 (07/27/22 0814)  Resp: 20 (07/27/22 0814)  BP: 126/78 (07/27/22 0814)  SpO2: 100 % (07/27/22 0814)     125.4 kg (276 lb 7.3 oz)     Physical Exam:    Physical Exam  General:  Well nourished and Obesity         Chest/Lungs:   CTA bilaterally   Heart/Vascular:  RRR   Abdomen:  Soft, NT, obese     Mental Status:  Cooperative, alert, NADN, no focal deficits       Laboratory  All pre op labs reviewed    Diagnostic Results:  Impression: Morbid Obesity, BMI 43.3    ASSESSMENT/PLAN:         PLAN:Plan   Laparoscopic sleeve gastrectomy  Dr. Crowley examined patient, obtained informed consent, and answered all questions.

## 2022-07-27 NOTE — DISCHARGE INSTRUCTIONS
HOSPITAL DISCHARGE INSTRUCTIONS    Clinic Phone Numbers       Surgeons office number and after hours on call surgeon: [phone].    ALWAYS call the surgeons office PRIOR to going to an Urgent Care or the emergency room. If medical emergency, call 911.     Signs and Symptoms that would warrant a phone call of the office (regardless of the time of day):     Fever greater than 101 F     Uncontrolled pain that does not improve with pain medication     Uncontrolled nausea that does not improve with nausea medication      Vomiting     Shortness of breath     Chest Pain     Foul smelling drainage from incision and/or yellow or green drainage from incision     Red, hot painful incisions     Bloody bowel movements     ** If you feel as though it is a life-threatening emergency, call 911 and go to the emergency room**          Prescriptions     Medications     Pain medication (if needed) Tylenol over the counter is safe to take for discomfort     Anti-nausea medication (if needed)     Proton Pump Inhibitor (finish all 30 days), call [phone] if you did not receive 30 days of medication       Supplements     Chewable multivitamin- take 2 times a day (unless otherwise directed)     Chewable Calcium Citrate with D3- take 3 times a day (unless otherwise directed)     Iron tablet- take once daily      MiraLAX- take once daily for 2 weeks       Home Medications     Please review your medication list that you received at pre-op class as well as at the hospital instructions upon discharge to assure you are resuming all medications that are deemed  safe after surgery.      ** REMINDER- You should have scheduled your follow-up with your prescribing doctor for 1-week post-op**          Appointments     Surgeons Post-op Visits- please review orange sheet you received in the mail after surgery. Call [phone] if you need to reschedule your surgeons post-op visit.      Bariatric Team Post-op Visits- please review blue sheet  you received in your e-mail or please reference [facility] for your post-op appointments. . Call [phone] option 1 if you need to reschedule your bariatric team post-op visit.          Nutritional Considerations     Hydration is CRITICAL!     Daily fluid intake of  oz water     Water is more important than protein      Review list of allowable and non-allowable liquids in your Bariatric Booklet    The only fluids that count towards your water goal is water, sugar free, caffeine free flavored water        Diet progression          Continue the dietary protocol until you meet with the dietitian at your 2-week visit     Strive to reach protein goal. Only liquids counted toward your protein goal are protein shake, milk and approved yogurt     It is MANDATORY that you do not progress your diet without speaking to the dietitian to prevent potential complications          Incisional Care     Wash your hands before you touch your incisions or dressings     Remove any gauze or dressing over your incision. ONLY steri-strips (butter-fly band aids) should remain over your incision     Shower daily with an anti-bacterial soap (Hibiclens or Dial, orange bar).      Allow water to hit your back in the shower     Wet the incisions with water     Apply soap to a clean washcloth and wash over your incisions (do not scrub)     Rinse your incision with water and pat dry with a clean towel      Check your incisions daily for any redness, swelling, hot to touch, or bright red, green or yellow drainage.             Dark red, dried blood, indention of an incision, bruising may appear under the steri-strips. This is normal.     Do not apply any creams, ointments, etc. on the incisions.      Leave incision open to air (unless instructed otherwise)          Activity     Walk and/or ride a stationary bike 20 minutes a day     Do not lift, pull or push anything greater than 10 pounds for 4-6 weeks     Do not go the gym until you are  4-6 weeks post-op     Use your incentive spirometer (breathing machine) 10 x an hour for 1-2 weeks     Shower daily, DO NOT submerge yourself in water until 2 weeks post-op and cleared by surgeon          Post-Operative Expectations     A soreness is to be expected. Pain differs for everyone. Please refer to the pain scale and list of when to call the doctor regarding pain.     Nausea can last a few weeks after surgery due to the body getting adjusted to the new small stomach. Take anti-nausea as needed and stay HYDRATED. Slight dehydration will cause nausea.     Constipation is common after surgery. Take MiraLAX daily even if your bowel movements are regular. Please refer to the FAQs regarding constipation. Water is essential in preventing constipation.

## 2022-07-27 NOTE — ANESTHESIA PROCEDURE NOTES
Intubation    Date/Time: 7/27/2022 9:22 AM  Performed by: Marta Lundberg CRNA  Authorized by: Muna Odell MD     Intubation:     Induction:  Intravenous    Intubated:  Postinduction    Mask Ventilation:  Easy mask    Attempts:  1    Attempted By:  CRNA    Method of Intubation:  Direct    Blade:  Castaneda 2    Difficult Airway Encountered?: No      Complications:  None    Airway Device:  Oral endotracheal tube    Airway Device Size:  7.5    Style/Cuff Inflation:  Cuffed (inflated to minimal occlusive pressure)    Inflation Amount (mL):  5    Tube secured:  21    Secured at:  The lips    Placement Verified By:  Capnometry    Complicating Factors:  Obesity    Findings Post-Intubation:  BS equal bilateral and atraumatic/condition of teeth unchanged

## 2022-07-27 NOTE — OP NOTE
[facility]  [address] [address]  Phone: [phone]   Surgery Department  Operative Note    SUMMARY     Date of Procedure: 7/27/2022     Procedure: Procedure(s) (LRB):  GASTRECTOMY, SLEEVE, LAPAROSCOPIC (N/A)  REPAIR, HERNIA, HIATAL, LAPAROSCOPIC (N/A)     Surgeon(s) and Role:     * William Crowley MD - Primary     * Gary Byrne MD - Resident - Assisting     MEMD@     Pre-Operative Diagnosis: Morbid Obesity    Post-Operative Diagnosis: Post-Op Diagnosis Codes:     * Morbid obesity [E66.01]  Hiatal hernia     Anesthesia: General    Operative Findings (including complications, if any): dictated per attending surgeon      Estimated Blood Loss (EBL): 30 cc    Specimens:   Specimen (24h ago, onward)             Start     Ordered    07/27/22 0957  Specimen to Pathology  RELEASE UPON ORDERING        References:    Click here for ordering Quick Tip   Question:  Release to patient  Answer:  Immediate    07/27/22 0957                        Condition: Stable    Disposition: PACU - hemodynamically stable.

## 2022-07-27 NOTE — TRANSFER OF CARE
Anesthesia Transfer of Care Note    Patient: Lisette Ervin    Procedure(s) Performed: Procedure(s) (LRB):  GASTRECTOMY, SLEEVE, LAPAROSCOPIC (N/A)  REPAIR, HERNIA, HIATAL, LAPAROSCOPIC (N/A)    Patient location: PACU    Anesthesia Type: general    Transport from OR: Transported from OR on room air with adequate spontaneous ventilation    Post pain: adequate analgesia    Post assessment: no apparent anesthetic complications    Post vital signs: stable    Level of consciousness: sedated    Nausea/Vomiting: no nausea/vomiting    Complications: none    Transfer of care protocol was followed    Hypertensive - see PACU VS. Labetalol IVP

## 2022-07-27 NOTE — OP NOTE
Operation: Laparoscopic Vertical sleeve gastrectomy and hiatal hernia repair    Indications:  This is a 46-year-old female who has undergone a comprehensive medical and psychological workup in preparation for today's bariatric operation.  She has been educated for appropriate preoperative and postoperative care to help ensure the safest an optimal result.    Preoperative diagnosis:  Morbid obesity, hiatal hernia    Postoperative diagnosis:  Same    Anesthesia:  General endotracheal    Blood loss:  5 cc    Complications:  None    Specimens:  Portion of stomach    Findings:  2 cm hiatal hernia    Surgeon: William Crowley MD  Assistant : Sampson Byrne MD Chief surgery resident, Latasha Bah NP    Operation details:  Patient was brought to the operating room and laid supine on the operating room table.  General anesthesia was administered and she was intubated endotracheally.  The abdomen was prepped and draped in the usual sterile fashion.    15 cm inferior to the xiphoid pubis and 5 cm left of midline an 11 mm incision was made and a Visiport was inserted under direct vision.  Pneumoperitoneum was achieved and there was no injury to intra-abdominal structures during the placement of this port.    A 5 mm working port was placed in the right upper quadrant along with a 15 mm port.  Two 5 mm ports were placed in the left upper quadrant and a 5 mm incision was made in the epigastrium through which a liver retractor was advanced and used to elevate the liver anteriorly exposing the hiatus in the anterior upper stomach.    There was a 2 cm hiatal hernia, the Harmonic device was used to take down the phrenoesophageal membrane and expose the right and left crura.    A Harmonic device was used to remove the short gastrics and gastroepiploic vessels from the greater curvature of the stomach beginning 6 cm proximal to the pylorus working all the way up to the angle of his.  A 34 Polish blunt-tip bougie was placed down the  esophagus into the stomach with the tip position at the pylorus.    A single 0 Ethibond suture was used to close the hiatal hernia defect over the bougie and secured with the tie knot reapproximating the right and left crura in an anterior fashion, repairing the hiatal hernia defect.    Sequential firings of green and then blue Endo-VEENA staple cartridges were used to complete the vertical sleeve gastrectomy alongside the 34 Romanian bougie beginning 6 cm proximal to the pylorus going all the way to the ligament of Treitz.  Fibrin glue was used as a hemostatic agent along the suture line, the stomach was retrieved through the 15 mm port site and sent to pathology as specimen.    A trans abdominis preperitoneal block was performed using 30 cc of 0.25 Marcaine in the preperitoneal plane bilaterally for postoperative analgesia.    The liver retractor was removed and the 15 mm incision site correction the 15 mm fascial incision site was closed with a 0 Vicryl with the aid of a laparoscopic needle Passer.    All skin incisions were closed with 4-0 subcuticular Vicryl sutures after the 5 mm and 15 and 11 mm ports were removed under direct vision sterile dressings were applied patient tolerated procedure well anesthesia placed extubate the patient in the PACU she remained in stable satisfactory condition for the duration of the operation

## 2022-07-28 VITALS
HEART RATE: 74 BPM | OXYGEN SATURATION: 99 % | BODY MASS INDEX: 43.32 KG/M2 | DIASTOLIC BLOOD PRESSURE: 65 MMHG | SYSTOLIC BLOOD PRESSURE: 119 MMHG | HEIGHT: 67 IN | RESPIRATION RATE: 18 BRPM | WEIGHT: 276 LBS | TEMPERATURE: 98 F

## 2022-07-28 LAB
ANION GAP SERPL CALC-SCNC: 11 MEQ/L
BASOPHILS # BLD AUTO: 0.02 X10(3)/MCL (ref 0–0.2)
BASOPHILS NFR BLD AUTO: 0.2 %
BUN SERPL-MCNC: 8.5 MG/DL (ref 7–18.7)
CALCIUM SERPL-MCNC: 9.4 MG/DL (ref 8.4–10.2)
CHLORIDE SERPL-SCNC: 106 MMOL/L (ref 98–107)
CO2 SERPL-SCNC: 24 MMOL/L (ref 22–29)
CREAT SERPL-MCNC: 0.79 MG/DL (ref 0.55–1.02)
CREAT/UREA NIT SERPL: 11
EOSINOPHIL # BLD AUTO: 0.01 X10(3)/MCL (ref 0–0.9)
EOSINOPHIL NFR BLD AUTO: 0.1 %
ERYTHROCYTE [DISTWIDTH] IN BLOOD BY AUTOMATED COUNT: 17.2 % (ref 11.5–17)
ESTROGEN SERPL-MCNC: NORMAL PG/ML
GLUCOSE SERPL-MCNC: 94 MG/DL (ref 74–100)
HCT VFR BLD AUTO: 33.5 % (ref 37–47)
HGB BLD-MCNC: 9.8 GM/DL (ref 12–16)
IMM GRANULOCYTES # BLD AUTO: 0.02 X10(3)/MCL (ref 0–0.04)
IMM GRANULOCYTES NFR BLD AUTO: 0.2 %
INSULIN SERPL-ACNC: NORMAL U[IU]/ML
LAB AP CLINICAL INFORMATION: NORMAL
LAB AP GROSS DESCRIPTION: NORMAL
LAB AP REPORT FOOTNOTES: NORMAL
LYMPHOCYTES # BLD AUTO: 2.43 X10(3)/MCL (ref 0.6–4.6)
LYMPHOCYTES NFR BLD AUTO: 26.6 %
MCH RBC QN AUTO: 22.7 PG (ref 27–31)
MCHC RBC AUTO-ENTMCNC: 29.3 MG/DL (ref 33–36)
MCV RBC AUTO: 77.7 FL (ref 80–94)
MONOCYTES # BLD AUTO: 0.68 X10(3)/MCL (ref 0.1–1.3)
MONOCYTES NFR BLD AUTO: 7.5 %
NEUTROPHILS # BLD AUTO: 6 X10(3)/MCL (ref 2.1–9.2)
NEUTROPHILS NFR BLD AUTO: 65.4 %
NRBC BLD AUTO-RTO: 0 %
PLATELET # BLD AUTO: 284 X10(3)/MCL (ref 130–400)
PMV BLD AUTO: 10.2 FL (ref 7.4–10.4)
POTASSIUM SERPL-SCNC: 3.9 MMOL/L (ref 3.5–5.1)
RBC # BLD AUTO: 4.31 X10(6)/MCL (ref 4.2–5.4)
SODIUM SERPL-SCNC: 141 MMOL/L (ref 136–145)
T3RU NFR SERPL: NORMAL %
WBC # SPEC AUTO: 9.1 X10(3)/MCL (ref 4.5–11.5)

## 2022-07-28 PROCEDURE — 80048 BASIC METABOLIC PNL TOTAL CA: CPT | Performed by: NURSE PRACTITIONER

## 2022-07-28 PROCEDURE — 94799 UNLISTED PULMONARY SVC/PX: CPT

## 2022-07-28 PROCEDURE — 85025 COMPLETE CBC W/AUTO DIFF WBC: CPT | Performed by: NURSE PRACTITIONER

## 2022-07-28 PROCEDURE — 36415 COLL VENOUS BLD VENIPUNCTURE: CPT | Performed by: NURSE PRACTITIONER

## 2022-07-28 PROCEDURE — 94761 N-INVAS EAR/PLS OXIMETRY MLT: CPT

## 2022-07-28 PROCEDURE — 25000003 PHARM REV CODE 250

## 2022-07-28 PROCEDURE — 63600175 PHARM REV CODE 636 W HCPCS: Performed by: NURSE PRACTITIONER

## 2022-07-28 PROCEDURE — 25000003 PHARM REV CODE 250: Performed by: NURSE PRACTITIONER

## 2022-07-28 PROCEDURE — 36415 COLL VENOUS BLD VENIPUNCTURE: CPT | Performed by: SURGERY

## 2022-07-28 RX ORDER — ACETAMINOPHEN 650 MG/20.3ML
650 LIQUID ORAL EVERY 6 HOURS
Status: DISCONTINUED | OUTPATIENT
Start: 2022-07-28 | End: 2022-07-28 | Stop reason: HOSPADM

## 2022-07-28 RX ORDER — SODIUM CHLORIDE, SODIUM LACTATE, POTASSIUM CHLORIDE, CALCIUM CHLORIDE 600; 310; 30; 20 MG/100ML; MG/100ML; MG/100ML; MG/100ML
INJECTION, SOLUTION INTRAVENOUS CONTINUOUS
Status: DISCONTINUED | OUTPATIENT
Start: 2022-07-28 | End: 2022-07-28 | Stop reason: HOSPADM

## 2022-07-28 RX ADMIN — KETOROLAC TROMETHAMINE 30 MG: 30 INJECTION, SOLUTION INTRAMUSCULAR; INTRAVENOUS at 06:07

## 2022-07-28 RX ADMIN — GABAPENTIN 300 MG: 300 CAPSULE ORAL at 08:07

## 2022-07-28 RX ADMIN — MEDROXYPROGESTERONE ACETATE 20 MG: 10 TABLET ORAL at 08:07

## 2022-07-28 RX ADMIN — KETOROLAC TROMETHAMINE 30 MG: 30 INJECTION, SOLUTION INTRAMUSCULAR; INTRAVENOUS at 12:07

## 2022-07-28 RX ADMIN — SODIUM CHLORIDE, POTASSIUM CHLORIDE, SODIUM LACTATE AND CALCIUM CHLORIDE: 600; 310; 30; 20 INJECTION, SOLUTION INTRAVENOUS at 08:07

## 2022-07-28 RX ADMIN — ENOXAPARIN SODIUM 40 MG: 100 INJECTION SUBCUTANEOUS at 08:07

## 2022-07-28 RX ADMIN — ACETAMINOPHEN 650 MG: 650 SOLUTION ORAL at 12:07

## 2022-07-28 RX ADMIN — PANTOPRAZOLE SODIUM 40 MG: 40 TABLET, DELAYED RELEASE ORAL at 08:07

## 2022-07-28 NOTE — MEDICAL/APP STUDENT
[facility] - [facility] Team  Daily Progress Note    Patient ID: Lisette SANTANA [facility] Day: 2   POD:1    Subjective:  Afebrile. NAEON. Pt doing well this morning. Pain is well controlled. Denies N/V and F/C. Is ambulating. No BM but is passing gas.     Objective:  Problem List:  Patient Active Problem List   Diagnosis    Morbid obesity with BMI of 40.0-44.9, adult    Hiatal hernia       Vitals:  Temp:  [97.2 °F (36.2 °C)-98.3 °F (36.8 °C)] 97.6 °F (36.4 °C)  Pulse:  [57-78] 70  Resp:  [16-20] 18  SpO2:  [95 %-100 %] 96 %  BP: (111-178)/(65-94) 112/65      Labs:  No results for input(s): HGB, WBC, PLT, BUN, CREATININE, MG, K in the last 168 hours.    Invalid input(s): CA}    Physical Exam:  Gen: NAD  Neuro: awake, alert, answering questions appropriately  Resp: non-labored breathing, GORDON  Abd: soft, ND, appropriate tenderness to palpation, wearing binder  Ext: moves all 4 spontaneously and purposefully  Skin: warm, well perfused        Assessment/Plan:  46 y.o.female with PMH of obesity, hypothyroidism, IBS, insulin resistance, 1 day s/p laparoscopic sleeve gastrectomy and hiatal hernia repair. Doing well.  - Start clear liquid diet  - Possible discharge this afternoon    Helen Pearson MS3  07/28/2022 6:49 AM

## 2022-07-28 NOTE — CONSULTS
"  [facility] - [address] [place] [address] Surg Telemetry  Adult Nutrition  Consult Note    SUMMARY     Recommendations    Recommendation/Intervention: Progress diet to full liquid per protocol  Goals: tolerate diet progression with no n/v/d/c  Nutrition Goal Status: new  Communication of ALISA Recs: reviewed with physician    Assessment and Plan    No new Assessment & Plan notes have been filed under this hospital service since the last note was generated.  Service: Nutrition       Malnutrition Assessment  Malnutrition Type: other (see comments) (does not meet criteria)                                    Reason for Assessment    Reason For Assessment: consult  Diagnosis: surgery/postoperative complications (s/p VSG)  Relevant Medical History: obesity,Vit D deficiency  Interdisciplinary Rounds: did not attend  General Information Comments: Pt doing well sitting up in chair, no reports on n/v/d/c.  Nutrition Discharge Planning: Pt to be discharged home on clear liquid    Nutrition Risk Screen    Nutrition Risk Screen: no indicators present    Nutrition/Diet History    Spiritual, Cultural Beliefs, Yarsani Practices, Values that Affect Care: no  Food Allergies: NKFA  Factors Affecting Nutritional Intake: clear liquid diet, early satiety, decreased appetite (to be expected s/p vsg)    Anthropometrics    Temp: 98.2 °F (36.8 °C)  Height: 5' 7" (170.2 cm)  Height (inches): 67 in  Weight: 125.2 kg (276 lb)  Weight (lb): 276 lb  Ideal Body Weight (IBW), Female: 135 lb  % Ideal Body Weight, Female (lb): 204.44 %  BMI (Calculated): 43.2  BMI Grade: greater than 40 - morbid obesity       Lab/Procedures/Meds    Pertinent Labs Comments: 7/28  hgb 9.8L, Hct 33.5L  Pertinent Medications Reviewed: reviewed  Pertinent Medications Comments: zofran, protonix, tramadol    Physical Findings/Assessment         Estimated/Assessed Needs    Weight Used For Calorie Calculations: 76.5 kg (168 lb 12 oz) (125% IBW)  Energy Calorie Requirements (kcal): " 1530-1913kcal (kcal/kg 125% IBw)     Protein Requirements: 75-83g (g/kg 125% IBW)  Weight Used For Protein Calculations: 75.2 kg (165 lb 12 oz)        RDA Method (mL): 1530         Nutrition Prescription Ordered    Current Diet Order: Clear liquid  Nutrition Order Comments: Pt to progress diet per bariatric protocol  Oral Nutrition Supplement: Protein shakes when she gets home    Evaluation of Received Nutrient/Fluid Intake    Energy Calories Required: not meeting needs  Protein Required: not meeting needs  Fluid Required: not meeting needs  Comments: as expected s/p VSG  Tolerance: tolerating  % Intake of Estimated Energy Needs: 0 - 25 %  % Meal Intake: 0 - 25 %    Nutrition Risk    Level of Risk/Frequency of Follow-up: low - moderate       Monitor and Evaluation    Food and Nutrient Intake: energy intake, food and beverage intake  Anthropometric Measurements: weight change, weight, body mass index       Nutrition Follow-Up    RD Follow-up?: Yes

## 2022-07-28 NOTE — PLAN OF CARE
Problem: Adult Inpatient Plan of Care  Goal: Plan of Care Review  7/28/2022 0836 by Taylor Vance RN  Outcome: Met  7/28/2022 0835 by Taylor Vance RN  Outcome: Ongoing, Progressing  Goal: Patient-Specific Goal (Individualized)  7/28/2022 0836 by Taylor Vance RN  Outcome: Met  7/28/2022 0835 by Taylor Vance RN  Outcome: Ongoing, Progressing  Goal: Absence of Hospital-Acquired Illness or Injury  7/28/2022 0836 by Taylor Vance RN  Outcome: Met  7/28/2022 0835 by Taylor Vance RN  Outcome: Ongoing, Progressing  Goal: Optimal Comfort and Wellbeing  7/28/2022 0836 by Taylor Vance RN  Outcome: Met  7/28/2022 0835 by Taylor Vance RN  Outcome: Ongoing, Progressing  Goal: Readiness for Transition of Care  7/28/2022 0836 by Taylor Vance RN  Outcome: Met  7/28/2022 0835 by Taylor Vance RN  Outcome: Ongoing, Progressing     Problem: Bariatric Environmental Safety  Goal: Safety Maintained with Care  7/28/2022 0836 by Taylor Vance RN  Outcome: Met  7/28/2022 0835 by Taylor Vance RN  Outcome: Ongoing, Progressing     Problem: Bariatric Environmental Safety  Goal: Safety Maintained with Care  7/28/2022 0836 by Taylor Vance RN  Outcome: Met  7/28/2022 0835 by Taylor Vance RN  Outcome: Ongoing, Progressing

## 2022-07-28 NOTE — PROGRESS NOTES
[facility] Progress Note    Subjective: NAEON, pain under control, denies n/v, is flatus gas but denies BM, ambulated around nursing station multiple times yesterday    Objective  Temp:  [97.2 °F (36.2 °C)-98.3 °F (36.8 °C)] 97.6 °F (36.4 °C)  Pulse:  [57-78] 70  Resp:  [16-20] 18  SpO2:  [95 %-100 %] 96 %  BP: (111-178)/(65-94) 112/65      Gen: NAD, AAOx3  CV: extremities wwp, regular rate, palpable radials  Pulm: nonlabored, no increased wob, equal chest rise b/l   Abd: s/nt/nd, abdominal binder in place  Wounds: Dressings over port sites clean and dry          A/P:    46 y.o. female POD1 from gastric sleeve. Doing well, no n/v, is passing flatus, pain well controlled, ambulating well.    Multimodal pain control  Clears today and if pt tolerates then d/c later today    Matilde Rodriguez MD   [facility] PGY1    Patient seen and examined, agree with assessment and plan as documented above.     Lucille Lopez MD  [facility], PGY-3

## 2022-07-28 NOTE — PLAN OF CARE
Problem: Bariatric Environmental Safety  Goal: Safety Maintained with Care  Outcome: Ongoing, Progressing

## 2022-07-28 NOTE — DISCHARGE SUMMARY
Admit Diagnosis:   1. Morbid Obesity    Discharge Diagnosis:   1. Morbid Obesity    Operations During Hospitalization: Laparoscopic Vertical Sleeve Gastrectomy with hiatal hernia repair     Hospital Course: 46 yr old female admitted to [facility] for an elective bariatric procedure. Procedure preformed as stated above. Upon completion of procedure, pt was transferred from the recovery room to the post surgical floor for further care and treatment. POD#1, afebrile, vital signs stable. The pt's diet was advanced to bariatric clear liquids.     Review of Systems: Mild incisional pain reported but tolerable. Some gas pain in chest and in between shoulder blades. No nausea, vomiting, dysphagia, GERD. Patient ambulating in the room/hallway and voiding without difficulty. Pt denies any dizziness, palpitations, SOB, or CP. All other review of systems are negative.     Physical Examination:   Vital signs: stable, noted in chart  General: Awake and alert, able to answer all questions. Resting in bed with family member at bedside  Respiratory:  Clear to auscultation bilaterally  Cardio: Regular rate and rhythm.  Abdomen: Soft, non distended. Bowel sounds present to all 4 quadrants. Lap sites clean, dry, and intact. Abdomen benign.  Neuro: Alert and oriented x's 4.    Labs: need labs.     Discharge Medication:   1. Protonix  2. Zofran  3. Phenergan  4. Tylenol, OTC  5. Toradol  6. Lovenox     Condition: Satisfactory    Disposition:  To home.   -Pt to f/u with HUMBERTO Sharma in two weeks  -Continue IS at home  -Walk 20min daily   -Continue bariatric clears throughout today and progress dietary protocol as instructed by dietitian tomorrow upon waking up at home  -Begin Lovenox tomorrow

## 2022-07-28 NOTE — MEDICAL/APP STUDENT
[facility]     NAEON. AF, HDS over past 24h. Doing well. Pain is well controlled. Passing flatus.. Tolerating a clear liquid diet with no nausea or vomiting. Ambulating normally.     NAD  RRR  Non-labored breathing, GORDON  S, ND, appropriately tender to palpation  Moves all extremities    No new labs today     45 yo F POD#1 from sleeve gastrectomy. Doing well.    - Discharge today     Sachi Stroud   [facility] General Surgery, MS4  07/28/2022 5:28 AM

## 2022-07-29 ENCOUNTER — PATIENT OUTREACH (OUTPATIENT)
Dept: ADMINISTRATIVE | Facility: CLINIC | Age: 47
End: 2022-07-29
Payer: MEDICAID

## 2022-07-29 ENCOUNTER — PATIENT MESSAGE (OUTPATIENT)
Dept: ADMINISTRATIVE | Facility: OTHER | Age: 47
End: 2022-07-29
Payer: MEDICAID

## 2022-07-29 RX ORDER — POLYETHYLENE GLYCOL 3350 17 G/17G
POWDER, FOR SOLUTION ORAL
COMMUNITY

## 2022-07-29 NOTE — PROGRESS NOTES
C3 nurse spoke with Lisette Ervin for a TCC post hospital discharge follow up call. The patient has a scheduled HOSFU appointment with Mela Teran on 08/12/2022 @ 0850 am via telemedicine.  Appointment with William Trejo MD 08/03/2022.    Patient stated taking;  Lovenox 40 mg injection daily x 14 days  Fergon 27 mg daily  Caltrate, vitamin D3 and minerals 1 tablet daily  [place] Complete multivitamin 1 chewable daily

## 2022-07-30 ENCOUNTER — PATIENT MESSAGE (OUTPATIENT)
Dept: ADMINISTRATIVE | Facility: OTHER | Age: 47
End: 2022-07-30
Payer: MEDICAID

## 2022-07-31 ENCOUNTER — PATIENT MESSAGE (OUTPATIENT)
Dept: ADMINISTRATIVE | Facility: OTHER | Age: 47
End: 2022-07-31
Payer: MEDICAID

## 2022-08-01 ENCOUNTER — PATIENT MESSAGE (OUTPATIENT)
Dept: ADMINISTRATIVE | Facility: OTHER | Age: 47
End: 2022-08-01
Payer: MEDICAID

## 2022-08-01 ENCOUNTER — TELEPHONE (OUTPATIENT)
Dept: BARIATRICS | Facility: HOSPITAL | Age: 47
End: 2022-08-01
Payer: MEDICAID

## 2022-08-01 NOTE — TELEPHONE ENCOUNTER
Pt answered that she was not getting enought water on care companion  Called and and she was only drinking about 20oz per day.   She felt so full she could not get more in.     Pt over the weekend went to [employer] and got ice chips and felt that helped and she was about to get in more water by eating those.     We will have 1 week f/u phone call Wednesday to check in with her, just encouraged her to increase water each day .

## 2022-08-02 ENCOUNTER — PATIENT MESSAGE (OUTPATIENT)
Dept: ADMINISTRATIVE | Facility: OTHER | Age: 47
End: 2022-08-02
Payer: MEDICAID

## 2022-08-03 ENCOUNTER — TELEPHONE (OUTPATIENT)
Dept: BARIATRICS | Facility: HOSPITAL | Age: 47
End: 2022-08-03
Payer: MEDICAID

## 2022-08-03 ENCOUNTER — PATIENT MESSAGE (OUTPATIENT)
Dept: ADMINISTRATIVE | Facility: OTHER | Age: 47
End: 2022-08-03
Payer: MEDICAID

## 2022-08-03 NOTE — TELEPHONE ENCOUNTER
Date call was completed:   Date and type of Surgery:  7/27/22    1) Are you drinking the recommended amount of water (73-100oz) a day? []  Yes        [x]  No  2) 60oz but it is getting easier     If not, how may ounces of water are you drinking?       3) Are you drinking the recommended amount of protein a day?(recommendations base on individual goal) []  Yes        [x]  No  If not, how many grams of protein are you drinking? 60g - will try to add in yogurt     4) Are you taking the recommended supplements that were discussed in pre-op class?  []  Yes   [] No  Multivitamin [x]  Yes        []  No  Calcium Supplement [x]  Yes        []  No  Iron [x]  Yes        []  No  Miralax [x]  Yes        []  No  Having regular BM    5) Are you walking at least 20 minutes a day for exercise? [x]  Yes   [] No    6) Have you resumed your home medications that you were instructed to resume? []  Yes   [x] No  Did not have to stop anything     7) Do you have a follow-up appt scheduled with your family doctor or doctor treating you for you co-morb conditions within the week? [x]  Yes   [] No                Has an apt today     8) Are you taking the Protonix (at night) that was prescribed to you in the hospital? [x]  Yes   [] No    9) Are you showering daily with an antibacterial soap?  [x]  Yes   [] no

## 2022-08-04 ENCOUNTER — PATIENT MESSAGE (OUTPATIENT)
Dept: ADMINISTRATIVE | Facility: OTHER | Age: 47
End: 2022-08-04
Payer: MEDICAID

## 2022-08-04 ENCOUNTER — NURSE TRIAGE (OUTPATIENT)
Dept: ADMINISTRATIVE | Facility: CLINIC | Age: 47
End: 2022-08-04
Payer: MEDICAID

## 2022-08-04 ENCOUNTER — TELEPHONE (OUTPATIENT)
Dept: BARIATRICS | Facility: HOSPITAL | Age: 47
End: 2022-08-04
Payer: MEDICAID

## 2022-08-04 NOTE — TELEPHONE ENCOUNTER
Pt is s/p gastric sleeve on 7/27 with Dr. Crowley.  She says she was told not to travel for more than 2 hours due to increased risk of blood clots.  She states her [place] hours from her home, and she is asking if this is too far for her to travel.  I advised that the recommendation is that she not travel for more than 2 consecutive hours in a seated position without moving, advised she take a break from traveling in the car and get out and walk around for a few minutes, both on the way going and coming from the destination, and to make sure that she is staying hydrated during her trip.  She verbalized understanding.  Reason for Disposition   [1] Follow-up call to recent contact AND [2] information only call, no triage required    Additional Information   Negative: [1] Caller is not with the adult (patient) AND [2] reporting urgent symptoms   Negative: Lab result questions   Negative: Medication questions   Negative: Caller can't be reached by phone   Negative: Caller has already spoken to PCP or another triager   Negative: RN needs further essential information from caller in order to complete triage   Negative: Requesting regular office appointment   Negative: [1] Caller requesting NON-URGENT health information AND [2] PCP's office is the best resource   Negative: [1] Caller is not with the adult (patient) AND [2] probable NON-URGENT symptoms   Negative: Question about upcoming scheduled test, no triage required and triager able to answer question   Negative: General information question, no triage required and triager able to answer question   Negative: Health Information question, no triage required and triager able to answer question    Protocols used: INFORMATION ONLY CALL - NO TRIAGE-[facility]

## 2022-08-12 ENCOUNTER — CLINICAL SUPPORT (OUTPATIENT)
Dept: BARIATRICS | Facility: HOSPITAL | Age: 47
End: 2022-08-12
Payer: MEDICAID

## 2022-08-12 NOTE — PROGRESS NOTES
Date:     POST OP BARIATRIC NUTRITION FOLLOW UP    Type of surgery: sleeve gastrectomy   Post-op visit:   [x] 2 weeks  [] 4 weeks:  [] 2 months:  [] 4 months:  [] 6 months:  [] 9 months:  [] 1 year:   [] Other:     Weight:      Post op complications:   [] Yes [x] No  If yes: [] Nausea   [] Vomiting   [] Constipation    [] Diarrhea    [] Other:     Dietary tolerance:  [] Yes [x] No [] Comment:     Adequate fluid intake:  [] Yes [x] No Approx. daily fluid intake:  64oz - will work on increasing   Adequate protein intake:  [x] Yes [] No  Approx. daily protein intake:    Vitamins/Minerals:   [x] MVI:    [] Biotin:  [x] Calcium:    [] Hair/Nails:  [] B 50 complex:   [] Bariatric Specific MVI:  [] B12:    [] Bariatric Specific Calcium:  [x] Iron:    [] Other:  mirlax as needed   [] Non-compliance:        Labs: NA   Comment:    Expected compliance:  [x]Good  []Fair  []Poor      Progress:   [x]Patient progressing well at this time with no complaints   [] Patient expressed complaint at this time: See additional notes       Bariatric Diet Education:   Verbalizes understanding Demonstrates  Needs further teaching Needs practice/ supervision Comments    Bariatric Clear [] [] [] []    Bariatric Full [] [] [] []    Bariatric Puree [] [] [] []    Bariatric Soft [] [] [] []    Bariatric Regular [x] [] [] []    Bariatric Reintroduction of Starchy CHO [] [] [] []    Bariatric: Mindful Eating [] [] [] []    Importance of Protein and Vitamin Protocol [] [] [] []    Other:            Additional Notes:   Pt doing well with complaints, and understands diet progression

## 2022-09-30 ENCOUNTER — CLINICAL SUPPORT (OUTPATIENT)
Dept: BARIATRICS | Facility: HOSPITAL | Age: 47
End: 2022-09-30
Payer: MEDICAID

## 2022-09-30 ENCOUNTER — CLINICAL SUPPORT (OUTPATIENT)
Dept: BARIATRICS | Facility: HOSPITAL | Age: 47
End: 2022-09-30

## 2022-09-30 VITALS — WEIGHT: 241.19 LBS | BODY MASS INDEX: 37.78 KG/M2

## 2022-09-30 NOTE — PROGRESS NOTES
Date:     POST OP BARIATRIC NUTRITION FOLLOW UP    Type of surgery: sleeve gastrectomy   Post-op visit:   [] 2 weeks  [] 4 weeks:  [x] 2 months:  [] 4 months:  [] 6 months:  [] 9 months:  [] 1 year:   [] Other:     Weight:  241lb    Post op complications:   [] Yes [x] No  If yes: [] Nausea   [] Vomiting   [] Constipation    [] Diarrhea    [] Other:     Dietary tolerance:  [x] Yes [] No [] Comment:     Adequate fluid intake:  [] Yes [x] No Approx. daily fluid intake: 33oz  Adequate protein intake:  [] Yes [x] No  Approx. daily protein intake: 40-50g     Vitamins/Minerals:   [x] MVI: finstone    [] Biotin:  [x] Calcium:    [] Hair/Nails:  [] B 50 complex:   [] Bariatric Specific MVI:  [] B12:    [] Bariatric Specific Calcium:  [x] Iron:    [] Other:   [] Non-compliance:      Will add in b vitamins  and look into bariatric advantage  Labs:  WBL  Comment:    Expected compliance:  [x]Good  []Fair  []Poor      Progress:   [x]Patient progressing well at this time with no complaints   [] Patient expressed complaint at this time: See additional notes       Bariatric Diet Education:   Verbalizes understanding Demonstrates  Needs further teaching Needs practice/ supervision Comments    Bariatric Clear [] [] [] []    Bariatric Full [] [] [] []    Bariatric Puree [] [] [] []    Bariatric Soft [] [] [] []    Bariatric Regular [] [] [] []    Bariatric Reintroduction of Starchy CHO [] [] [] []    Bariatric: Mindful Eating [] [] [] []    Importance of Protein and Vitamin Protocol [] [] [] []    Other:            Additional Notes:   Pt doing well at this time, can eat much in a sitting so spoke about ways  to increase protein and water     B: 1 egg or fruit and protein shake   S: Yogurt   L: 1oz grilled chicken broccoli  S: 2 bites yogurt   D: 1egg cheese omelette

## 2022-09-30 NOTE — PROGRESS NOTES
A 2 month F/U post gastric sleeve surgery was conducted with patient. She states that she is walking for exercise 30 minutes five day a week.  No issues or concerns    A body composition and measurements were conducted.  Patient was educated on results. She lost 47 lbs. And 22 inches.    PLAN:  - Patient will walk 30 minutes or more 3-5x/week and start strengthening exercises 2x/week.  - Patient will follow dietary advice from Shirin Live RD    It is my professional opinion that patient is in the preparation stage of behavior change.    ALONA Leblanc, CPT, CHC

## 2022-11-18 ENCOUNTER — CLINICAL SUPPORT (OUTPATIENT)
Dept: BARIATRICS | Facility: HOSPITAL | Age: 47
End: 2022-11-18

## 2022-11-18 ENCOUNTER — TELEPHONE (OUTPATIENT)
Dept: BARIATRICS | Facility: HOSPITAL | Age: 47
End: 2022-11-18

## 2022-11-18 NOTE — PROGRESS NOTES
Date:     POST OP BARIATRIC NUTRITION FOLLOW UP    Type of surgery: sleeve gastrectomy   Post-op visit:   [] 2 weeks  [] 4 weeks:  [x] 2 months:  [] 4 months:  [] 6 months:  [] 9 months:  [] 1 year:   [] Other:     Weight:  222lb --66 lns     Post op complications:   [] Yes [x] No  If yes: [] Nausea   [] Vomiting   [] Constipation    [] Diarrhea    [] Other:     Dietary tolerance:  [] Yes [] No [] Comment:     Adequate fluid intake:  [] Yes [] No Approx. daily fluid intake:   Adequate protein intake:  [x] Yes [] No  Approx. daily protein intake: at least 82g     Vitamins/Minerals:   [x] MVI:    [x] Biotin/ collegen  [x] Calcium:    [] Hair/Nails:  [x] B 50 complex:   [] Bariatric Specific MVI:  [x] B12:    [] Bariatric Specific Calcium:  [] Iron:    [] Other:   [] Non-compliance:        Labs: NA  Comment:    Expected compliance:  [x]Good  []Fair  []Poor      Progress:   [x]Patient progressing well at this time with no complaints   [] Patient expressed complaint at this time: See additional notes       Bariatric Diet Education:   Verbalizes understanding Demonstrates  Needs further teaching Needs practice/ supervision Comments    Bariatric Clear [] [] [] []    Bariatric Full [] [] [] []    Bariatric Puree [] [] [] []    Bariatric Soft [] [] [] []    Bariatric Regular [x] [] [] []    Bariatric Reintroduction of Starchy CHO [] [] [] []    Bariatric: Mindful Eating [] [] [] []    Importance of Protein and Vitamin Protocol [] [] [] []    Other:            Additional Notes:   pt doing well, she is relying on protein shakes a lot, encouraged her to eat foods and make sure to no skip lunch

## 2023-01-27 ENCOUNTER — CLINICAL SUPPORT (OUTPATIENT)
Dept: BARIATRICS | Facility: HOSPITAL | Age: 48
End: 2023-01-27

## 2023-01-27 VITALS — WEIGHT: 199.5 LBS | BODY MASS INDEX: 31.25 KG/M2

## 2023-01-27 NOTE — PROGRESS NOTES
POST OP BARIATRIC NUTRITION FOLLOW UP    Type of surgery: sleeve gastrectomy   Post-op visit:   [] 2 weeks  [] 4 weeks:  [] 2 months:  [] 4 months:  [x] 6 months:  [] 9 months:  [] 1 year:   [] Other:     Weight:  199.5#    Post op complications:   [] Yes [x] No  If yes: [] Nausea   [] Vomiting   [] Constipation    [] Diarrhea    [] Other:     Dietary tolerance:  [x] Yes [] No [] Comment:     Adequate fluid intake:  [] Yes [x] No Approx. daily fluid intake: 32oz --- will work on increasing   Adequate protein intake:  [] Yes [x] No  Approx. daily protein intake:  70 on average, goal is 80g will add in a snack     Vitamins/Minerals:   [x] MVI:    [x] Biotin:   [x] Calcium: ELINOR   [] Hair/Nails:  [x] B 50 complex:   [] Bariatric Specific MVI:  [x] B12:    [] Bariatric Specific Calcium:  [] Iron:    [] Other:   [] Non-compliance:        Labs:   iron sat 13L,   Comment:    Expected compliance:  [x]Good  []Fair  []Poor      Progress:   [x]Patient progressing well at this time with no complaints   [] Patient expressed complaint at this time: See additional notes       Bariatric Diet Education:   Verbalizes understanding Demonstrates  Needs further teaching Needs practice/ supervision Comments    Bariatric Clear [] [] [] []    Bariatric Full [] [] [] []    Bariatric Puree [] [] [] []    Bariatric Soft [] [] [] []    Bariatric Regular [] [] [] []    Bariatric Reintroduction of Starchy CHO [] [] [] []    Bariatric: Mindful Eating [] [] [] []    Importance of Protein and Vitamin Protocol [] [] [] []    Other:            Additional Notes:     Pt doing well, will work on increasing protein and water     Fairlife protein shake   Nuts   4oz meat

## 2023-01-27 NOTE — PROGRESS NOTES
A 6 month F/U was conducted with Lisette in the office. She is exercising 3-5x/week.  No issues or concerns at this time.    A body composition was conducted and patient has lost 88 lbs. And 43 inches.    It is my professional opinion that patient is in the action phase of behavior change.      ALONA Leblanc, CPT, CHC

## 2023-04-20 ENCOUNTER — CLINICAL SUPPORT (OUTPATIENT)
Dept: BARIATRICS | Facility: HOSPITAL | Age: 48
End: 2023-04-20

## 2023-04-20 VITALS — WEIGHT: 183 LBS | BODY MASS INDEX: 28.66 KG/M2

## 2023-04-20 VITALS — BODY MASS INDEX: 28.66 KG/M2 | WEIGHT: 183 LBS

## 2023-04-20 NOTE — PROGRESS NOTES
POST OP BARIATRIC NUTRITION FOLLOW UP    Type of surgery: sleeve gastrectomy   Post-op visit:   [] 2 weeks  [] 4 weeks:  [] 2 months:  [] 4 months:  [] 6 months:  [x] 9 months:  [] 1 year:   [] Other:     Weight: 183       Post op complications:   [] Yes [x] No  If yes: [] Nausea   [] Vomiting   [] Constipation    [] Diarrhea    [] Other:  but passing kidney stones     Dietary tolerance:  [x] Yes [] No [] Comment:     Adequate fluid intake:  [x] Yes [] No Approx. daily fluid intake:   Adequate protein intake:  [x] Yes [] No  Approx. daily protein intake:    Vitamins/Minerals:   [x] MVI:    [] Biotin:  [x] Calcium:    [] Hair/Nails:  [x] B 50 complex:   [] Bariatric Specific MVI:  [x] B12:    [] Bariatric Specific Calcium:  [] Iron:    [] Other:   [] Non-compliance:        Labs:   NA  Comment:    Expected compliance:  [x]Good  []Fair  []Poor      Progress:   [x]Patient progressing well at this time with no complaints   [] Patient expressed complaint at this time: See additional notes       Bariatric Diet Education:   Verbalizes understanding Demonstrates  Needs further teaching Needs practice/ supervision Comments    Bariatric Clear [] [] [] []    Bariatric Full [] [] [] []    Bariatric Puree [] [] [] []    Bariatric Soft [] [] [] []    Bariatric Regular [x] [] [] []    Bariatric Reintroduction of Starchy CHO [] [] [] []    Bariatric: Mindful Eating [] [] [] []    Importance of Protein and Vitamin Protocol [] [] [] []    Other:            Additional Notes:      Pt is doing well at this time

## 2023-04-20 NOTE — PROGRESS NOTES
A 9 month F/U was conducted with Lisette via phone. She reports a verbal weight of 183 lbs. She is riding her bike, walking and dancing for exercise. She reports no issues or concerns at this time.      ALONA Leblanc, CPT, CHC

## 2023-07-31 ENCOUNTER — CLINICAL SUPPORT (OUTPATIENT)
Dept: BARIATRICS | Facility: HOSPITAL | Age: 48
End: 2023-07-31

## 2023-07-31 VITALS — WEIGHT: 180 LBS | BODY MASS INDEX: 28.19 KG/M2

## 2023-07-31 NOTE — PROGRESS NOTES
POST OP BARIATRIC NUTRITION FOLLOW UP    Type of surgery: sleeve gastrectomy   Post-op visit:   [] 2 weeks  [] 4 weeks:  [] 2 months:  [] 4 months:  [] 6 months:  [] 9 months:  [x] 1 year:   [] Other:     Weight: 180#       Post op complications:   [] Yes [x] No  If yes: [] Nausea   [] Vomiting   [] Constipation    [] Diarrhea    [] Other:     Dietary tolerance:  [x] Yes [] No [] Comment:     Adequate fluid intake:  [] Yes [x] No Approx. daily fluid intake:   never met the goal but she is getting as much as she can   Adequate protein intake:  [x] Yes [] No  Approx. daily protein intake: 70-80g    Vitamins/Minerals:   [x] MVI:    [] Biotin:  [] Calcium: No due to kidney stones    [] Hair/Nails:  [x] B 50 complex:   [] Bariatric Specific MVI:  [x] B12:    [] Bariatric Specific Calcium:  [] Iron:    [] Other:   [] Non-compliance:        Labs:   WNL  Comment:    Expected compliance:  [x]Good  []Fair  []Poor      Progress:   [x]Patient progressing well at this time with no complaints   [] Patient expressed complaint at this time: See additional notes       Bariatric Diet Education:   Verbalizes understanding Demonstrates  Needs further teaching Needs practice/ supervision Comments    Bariatric Clear [] [] [] []    Bariatric Full [] [] [] []    Bariatric Puree [] [] [] []    Bariatric Soft [] [] [] []    Bariatric Regular [x] [] [] []    Bariatric Reintroduction of Starchy CHO [] [] [] []    Bariatric: Mindful Eating [] [] [] []    Importance of Protein and Vitamin Protocol [] [] [] []    Other:            Additional Notes:      Pt is doing well with no complaints still focusing on protein and not eating much starches

## 2023-07-31 NOTE — PROGRESS NOTES
A body composition was conducted and patient lost 106 lbs. And 54.5 inches since preop body composition was conducted.  Patient was educated on her results. She would like to weigh 180-185 lbs.  She is exercising 3-5 days of the week.  No issues or concerns at this time.      ALONA Leblanc, CPT, CHC

## 2024-06-06 ENCOUNTER — TELEPHONE (OUTPATIENT)
Dept: SURGERY | Facility: CLINIC | Age: 49
End: 2024-06-06

## 2024-07-19 ENCOUNTER — OFFICE VISIT (OUTPATIENT)
Dept: SURGERY | Facility: CLINIC | Age: 49
End: 2024-07-19
Payer: COMMERCIAL

## 2024-07-19 ENCOUNTER — PATIENT MESSAGE (OUTPATIENT)
Dept: SURGERY | Facility: CLINIC | Age: 49
End: 2024-07-19

## 2024-07-19 ENCOUNTER — LAB VISIT (OUTPATIENT)
Dept: LAB | Facility: HOSPITAL | Age: 49
End: 2024-07-19
Attending: SURGERY

## 2024-07-19 ENCOUNTER — CLINICAL SUPPORT (OUTPATIENT)
Dept: BARIATRICS | Facility: HOSPITAL | Age: 49
End: 2024-07-19
Attending: SURGERY

## 2024-07-19 VITALS
HEART RATE: 56 BPM | DIASTOLIC BLOOD PRESSURE: 65 MMHG | SYSTOLIC BLOOD PRESSURE: 121 MMHG | WEIGHT: 170.38 LBS | BODY MASS INDEX: 26.74 KG/M2 | HEIGHT: 67 IN

## 2024-07-19 DIAGNOSIS — E66.9 OBESITY, UNSPECIFIED CLASSIFICATION, UNSPECIFIED OBESITY TYPE, UNSPECIFIED WHETHER SERIOUS COMORBIDITY PRESENT: ICD-10-CM

## 2024-07-19 DIAGNOSIS — E66.9 OBESITY: Primary | ICD-10-CM

## 2024-07-19 DIAGNOSIS — Z71.3 DIETARY COUNSELING: ICD-10-CM

## 2024-07-19 DIAGNOSIS — Z90.3 S/P GASTRIC SLEEVE PROCEDURE: ICD-10-CM

## 2024-07-19 DIAGNOSIS — Z13.0 SCREENING, ANEMIA, DEFICIENCY, IRON: ICD-10-CM

## 2024-07-19 DIAGNOSIS — Z71.3 ENCOUNTER FOR WEIGHT LOSS COUNSELING: ICD-10-CM

## 2024-07-19 DIAGNOSIS — Z71.82 EXERCISE COUNSELING: ICD-10-CM

## 2024-07-19 DIAGNOSIS — R25.2 LEG CRAMPS: ICD-10-CM

## 2024-07-19 DIAGNOSIS — Z13.0 SCREENING, ANEMIA, DEFICIENCY, IRON: Primary | ICD-10-CM

## 2024-07-19 LAB
BASOPHILS # BLD AUTO: 0.04 X10(3)/MCL
BASOPHILS NFR BLD AUTO: 0.6 %
EOSINOPHIL # BLD AUTO: 0.15 X10(3)/MCL (ref 0–0.9)
EOSINOPHIL NFR BLD AUTO: 2.4 %
ERYTHROCYTE [DISTWIDTH] IN BLOOD BY AUTOMATED COUNT: 13.5 % (ref 11.5–17)
HCT VFR BLD AUTO: 42.7 % (ref 37–47)
HGB BLD-MCNC: 14.7 G/DL (ref 12–16)
IMM GRANULOCYTES # BLD AUTO: 0.01 X10(3)/MCL (ref 0–0.04)
IMM GRANULOCYTES NFR BLD AUTO: 0.2 %
LYMPHOCYTES # BLD AUTO: 2.4 X10(3)/MCL (ref 0.6–4.6)
LYMPHOCYTES NFR BLD AUTO: 38.5 %
MCH RBC QN AUTO: 31.3 PG (ref 27–31)
MCHC RBC AUTO-ENTMCNC: 34.4 G/DL (ref 33–36)
MCV RBC AUTO: 90.9 FL (ref 80–94)
MONOCYTES # BLD AUTO: 0.43 X10(3)/MCL (ref 0.1–1.3)
MONOCYTES NFR BLD AUTO: 6.9 %
NEUTROPHILS # BLD AUTO: 3.2 X10(3)/MCL (ref 2.1–9.2)
NEUTROPHILS NFR BLD AUTO: 51.4 %
NRBC BLD AUTO-RTO: 0 %
PLATELET # BLD AUTO: 262 X10(3)/MCL (ref 130–400)
PMV BLD AUTO: 10.5 FL (ref 7.4–10.4)
RBC # BLD AUTO: 4.7 X10(6)/MCL (ref 4.2–5.4)
WBC # BLD AUTO: 6.23 X10(3)/MCL (ref 4.5–11.5)

## 2024-07-19 PROCEDURE — 3044F HG A1C LEVEL LT 7.0%: CPT | Mod: CPTII,,, | Performed by: NURSE PRACTITIONER

## 2024-07-19 PROCEDURE — 3078F DIAST BP <80 MM HG: CPT | Mod: CPTII,,, | Performed by: NURSE PRACTITIONER

## 2024-07-19 PROCEDURE — 1159F MED LIST DOCD IN RCRD: CPT | Mod: CPTII,,, | Performed by: NURSE PRACTITIONER

## 2024-07-19 PROCEDURE — 36415 COLL VENOUS BLD VENIPUNCTURE: CPT

## 2024-07-19 PROCEDURE — 3008F BODY MASS INDEX DOCD: CPT | Mod: CPTII,,, | Performed by: NURSE PRACTITIONER

## 2024-07-19 PROCEDURE — 99214 OFFICE O/P EST MOD 30 MIN: CPT | Mod: ,,, | Performed by: NURSE PRACTITIONER

## 2024-07-19 PROCEDURE — 3074F SYST BP LT 130 MM HG: CPT | Mod: CPTII,,, | Performed by: NURSE PRACTITIONER

## 2024-07-19 RX ORDER — BENZONATATE 200 MG/1
200 CAPSULE ORAL 3 TIMES DAILY PRN
Qty: 60 CAPSULE | Refills: 0 | Status: SHIPPED | OUTPATIENT
Start: 2024-07-19 | End: 2024-08-08

## 2024-07-19 NOTE — PROGRESS NOTES
A body composition was conducted with patient. Patient's current weight is 170 lbs.    ALONA Leblanc, CPT, CHC

## 2024-07-19 NOTE — PROGRESS NOTES
"Progress Note  Lisette Ervin    History of Present Illness:  Ms. Lisette Ervin is a 48 y.o. female who is 2 years s/p lap gastric sleeve surgery on 7/27/22 by William Crowley MD. Presents today for annual bariatric follow up appt. No complaints with tolerating PO. No dysphagia, odynophagia, GERD, NV, or abd pain. Regular BMs.     Unable to get in really more one bottle of water a day.   L lower leg cramps occasionally but notices that if she increases her water intake or electrolytes it will go away.     Diet: compliant with bariatric meal plan  Exercise: regular exercise, walking and strength training   Vitamins: compliant with bariatric supplementation. No calcium at this time due to kidney stones         Labs (6/21/24): A1c: 5.4%,         HT: 67in  Weights:   Trend Weights BMI   Starting 288#    1 Year 180# 29   2 Year 170#              For Adults 20 Years and Older:  BMI Weight Status   Below 18.5 Underweight   18.6-24.9 Normal/Healthy   25.0-29.9 Overweight   30.0 & Above Obese     Ideal Weight Range for Your Height: 5'7" = 121 - 158 lbs       Pertinent History:  HTN - [] Yes   [x] No    [] Resolved  HLD - [] Yes   [x] No    [] Resolved  DM  -  [] Yes   [x] No    [] Resolved  JERMAINE - [] Yes   [x] No   [] Resolved  GERD - [] Yes   [] No [x] Resolved, mild   Anticoagulation- [] Yes   [x] No      If yes, type: [] ASA [] Plavix [] Other    Patient Care Team:  Neil, William Barraza MD as PCP - General (Family Medicine)  William Crowley MD as Surgeon (Bariatrics)  Elizabeth Jacobs MD as Obstetrician (Obstetrics and Gynecology)  Deny Guillaume MD (Cardiovascular Disease)  Zeeshan Matthews MD (Endocrinology)  Chai Ashley MD (Neurology)     Subjective:     Review of Systems:  See HPI for details    Review of patient's allergies indicates:   Allergen Reactions    Ketamine Hallucinations     Other reaction(s): Anxiety    Doxycycline Nausea And Vomiting    Gatifloxacin Itching and Rash    Moxifloxacin Itching and Rash "     Past Medical History:   Diagnosis Date    Abnormal weight gain     Anemia     Arthritis     Breast pain     Chronic back pain     Concussion     Depression     GERD (gastroesophageal reflux disease)     History of hemorrhoids     History of nipple discharge     History of shingles     Hypothyroidism     Insulin resistance     Irritable bowel syndrome     Kidney stones     Malaise and fatigue     PCOS (polycystic ovarian syndrome)     Vitamin B 12 deficiency     Vitamin deficiency      Past Surgical History:   Procedure Laterality Date    CHOLECYSTECTOMY      COLONOSCOPY      CYSTOSCOPY      DIAGNOSTIC LAPAROSCOPY      LAPAROSCOPIC SLEEVE GASTRECTOMY N/A 07/27/2022    Procedure: GASTRECTOMY, SLEEVE, LAPAROSCOPIC;  Surgeon: William Crowley MD;  [facility];  Service: General;  Laterality: N/A;    MYRINGOTOMY W/ TUBES      ORTHOPEDIC SURGERY      TONSILLECTOMY       Family History   Problem Relation Name Age of Onset    Diabetes type II Mother      Heart disease Father      Diabetes type II Father      Lung cancer Maternal Grandmother      Diabetes type II Maternal Grandmother      Lung cancer Maternal Grandfather      Stroke Paternal Grandmother      Heart attack Paternal Grandmother      Colon cancer Paternal Grandfather      Heart attack Paternal Grandfather      Throat cancer Other aunt     Bladder Cancer Other Aunt     Colon cancer Other aunt     Lung cancer Other uncle      Social History     Tobacco Use    Smoking status: Never    Smokeless tobacco: Never   Substance Use Topics    Alcohol use: Not Currently     Alcohol/week: 2.0 standard drinks of alcohol     Types: 1 Shots of liquor, 1 Cans of beer per week     Comment: occasionally    Drug use: Never      Current Outpatient Medications   Medication Instructions    benzonatate (TESSALON) 200 mg, Oral, 3 times daily PRN    biotin 5,000 mcg Chew Oral    diphenhydramine HCl (ALLERGY MEDICATION ORAL) Oral    multivit-minerals/folic acid (CENTRUM ADULTS  "ORAL) Oral    VITAMIN B COMPLEX ORAL 1 tablet, Oral, Every morning       Objective:     Vital Signs (Most Recent):  Visit Vitals  /65   Pulse (!) 56   Ht 5' 7" (1.702 m)   Wt 77.3 kg (170 lb 6.4 oz)   BMI 26.69 kg/m²       Physical Exam:  General:  Alert and oriented.    Respiratory:  Lungs are clear to auscultation, Respirations are non-labored, Breath sounds are equal.    Cardiovascular:  Normal rate, Regular rhythm, No murmur.    Gastrointestinal:  Soft, Non-tender, Non-distended, Normal bowel sounds        Musculoskeletal:  Normal range of motion, Normal strength.    Integumentary:  Warm, Dry, Pink.    Neurologic:  Alert, Oriented.    Psychiatric:  Cooperative.      Assessment:       ICD-10-CM ICD-9-CM   1. Screening, anemia, deficiency, iron  Z13.0 V78.0   2. Encounter for weight loss counseling  Z71.3 V65.3   3. Exercise counseling  Z71.82 V65.41   4. Dietary counseling  Z71.3 V65.3   5. Obesity, unspecified classification, unspecified obesity type, unspecified whether serious comorbidity present  E66.9 278.00   6. S/P gastric sleeve procedure  Z90.3 V45.75   7. Leg cramps  R25.2 729.82       Plan:     1. Screening, anemia, deficiency, iron  CBC Auto Differential      2. Encounter for weight loss counseling        3. Exercise counseling        4. Dietary counseling        5. Obesity, unspecified classification, unspecified obesity type, unspecified whether serious comorbidity present        6. S/P gastric sleeve procedure        7. Leg cramps          - need CBC, will review once back in chart.   - Discussed common downfalls of patients that gain weight after their first year such as adding in carbs too soon, popcorn or other snack type foods rather than eating protein forward meals, increasing nut intake, or not measuring amount when eating nuts since this can be very high in calories, as well as falling back into previous bad habits. Pt verbalized understanding of some of the issues her is doing wrong " and will try and correct these.   - set timers on phone to increase water intake throughout the day, add in LMNT electrolyte drink   - magnesium glycinate 400mg nightly for leg cramps.   - Discussed possible surgical risks with patient that can occur at any point in time such as but not limited to vitamin and mineral deficiency, ulceration from smoking/NSAIDs/Anti-inflammatory medications, gallbladder disfunction, etc. If patient has any severe abd pain that is constant, nausea, vomiting, disruption of bowel movements, or has other concerns they are instructed to call the office or present to the emergency room. Pt verbalized understanding   - F/U 1 year with bariatric labs (annually)  - Continue exercising and following bariatric vitamin supplementation  - Support group attendance encouraged   - Keep routine appts with PCP/specialists as scheduled

## 2025-05-07 ENCOUNTER — TELEPHONE (OUTPATIENT)
Dept: SURGERY | Facility: CLINIC | Age: 50
End: 2025-05-07
Payer: COMMERCIAL

## 2025-05-07 DIAGNOSIS — Z13.0 SCREENING, ANEMIA, DEFICIENCY, IRON: ICD-10-CM

## 2025-05-07 DIAGNOSIS — Z91.89 ELECTROLYTE IMBALANCE RISK: ICD-10-CM

## 2025-05-07 DIAGNOSIS — Z13.21 ENCOUNTER FOR VITAMIN DEFICIENCY SCREENING: Primary | ICD-10-CM

## 2025-06-25 LAB
25(OH)D3+25(OH)D2 SERPL-MCNC: 27.6 NG/ML (ref 30–100)
ALBUMIN SERPL-MCNC: 4.6 G/DL (ref 3.9–4.9)
ALP SERPL-CCNC: 61 IU/L (ref 44–121)
ALT SERPL-CCNC: 11 IU/L (ref 0–32)
AST SERPL-CCNC: 20 IU/L (ref 0–40)
BASOPHILS # BLD AUTO: 0 X10E3/UL (ref 0–0.2)
BASOPHILS NFR BLD AUTO: 1 %
BILIRUB SERPL-MCNC: 0.5 MG/DL (ref 0–1.2)
BUN SERPL-MCNC: 12 MG/DL (ref 6–24)
BUN/CREAT SERPL: 16 (ref 9–23)
CALCIUM SERPL-MCNC: 9.6 MG/DL (ref 8.7–10.2)
CHLORIDE SERPL-SCNC: 103 MMOL/L (ref 96–106)
CO2 SERPL-SCNC: 25 MMOL/L (ref 20–29)
CREAT SERPL-MCNC: 0.74 MG/DL (ref 0.57–1)
EOSINOPHIL # BLD AUTO: 0.1 X10E3/UL (ref 0–0.4)
EOSINOPHIL NFR BLD AUTO: 2 %
ERYTHROCYTE [DISTWIDTH] IN BLOOD BY AUTOMATED COUNT: 12.7 % (ref 11.7–15.4)
GFR SERPLBLD CREATININE-BSD FMLA CKD-EPI: 99 ML/MIN/1.73
GLOBULIN SER CALC-MCNC: 2.2 G/DL (ref 1.5–4.5)
GLUCOSE SERPL-MCNC: 86 MG/DL (ref 70–99)
HCT VFR BLD AUTO: 41.5 % (ref 34–46.6)
HGB BLD-MCNC: 13.6 G/DL (ref 11.1–15.9)
IMM GRANULOCYTES # BLD AUTO: 0 X10E3/UL (ref 0–0.1)
IMM GRANULOCYTES NFR BLD AUTO: 0 %
IRON SATN MFR SERPL: 28 % (ref 15–55)
IRON SERPL-MCNC: 105 UG/DL (ref 27–159)
LYMPHOCYTES # BLD AUTO: 2.1 X10E3/UL (ref 0.7–3.1)
LYMPHOCYTES NFR BLD AUTO: 44 %
MCH RBC QN AUTO: 29.8 PG (ref 26.6–33)
MCHC RBC AUTO-ENTMCNC: 32.8 G/DL (ref 31.5–35.7)
MCV RBC AUTO: 91 FL (ref 79–97)
MONOCYTES # BLD AUTO: 0.3 X10E3/UL (ref 0.1–0.9)
MONOCYTES NFR BLD AUTO: 7 %
NEUTROPHILS # BLD AUTO: 2.2 X10E3/UL (ref 1.4–7)
NEUTROPHILS NFR BLD AUTO: 46 %
PLATELET # BLD AUTO: 244 X10E3/UL (ref 150–450)
POTASSIUM SERPL-SCNC: 5.2 MMOL/L (ref 3.5–5.2)
PROT SERPL-MCNC: 6.8 G/DL (ref 6–8.5)
RBC # BLD AUTO: 4.56 X10E6/UL (ref 3.77–5.28)
SODIUM SERPL-SCNC: 143 MMOL/L (ref 134–144)
TIBC SERPL-MCNC: 380 UG/DL (ref 250–450)
UIBC SERPL-MCNC: 275 UG/DL (ref 131–425)
VIT B1 BLD-SCNC: 137.9 NMOL/L (ref 66.5–200)
WBC # BLD AUTO: 4.7 X10E3/UL (ref 3.4–10.8)

## 2025-06-26 ENCOUNTER — PATIENT MESSAGE (OUTPATIENT)
Dept: SURGERY | Facility: CLINIC | Age: 50
End: 2025-06-26
Payer: COMMERCIAL

## 2025-07-01 ENCOUNTER — TELEPHONE (OUTPATIENT)
Dept: SURGERY | Facility: CLINIC | Age: 50
End: 2025-07-01
Payer: COMMERCIAL

## 2025-07-01 NOTE — TELEPHONE ENCOUNTER
So patient did not have b12 drawn   Looks like someone placed additional orders but put them under  but she is  patient   Just fyi for her appt on Thursday

## 2025-07-02 NOTE — PROGRESS NOTES
"Progress Note  Lisette Ervin  Chief Complaint   Patient presents with    Follow-up     VSG 7/27/22- 3 year - [facility]       History of Present Illness:  Ms. Lisette Ervin is a 48 y.o. female who is 2 years s/p lap gastric sleeve surgery on 7/27/22 by William Crowley MD. Presents today for annual bariatric follow up appt. No complaints with tolerating PO. No dysphagia, odynophagia, GERD, NV, or abd pain. Regular BMs.      +weight gain. Notes that she just recently saw her obgyn and discussed possible hormonal replacement therapy but declined at the time. But notes that she has been very fatigued lately and that is not like her. Increase in hunger and snacking at times. Was unable to get to the gym because of the fatigue. Just recently began going back to the gym and started to increase protein and has felt better.      Diet: compliant with bariatric meal plan mostly but is not hitting protein goal.   Exercise: regular exercise, gym   Vitamins: compliant with bariatric supplementation. No calcium at this time due to kidney stones          Labs (6/25/25): vitamin D: 27.6, all other WNLs. B12 did not get drawn.       Labs (6/21/24): A1c: 5.4%         HT: 67in  Weights:   Trend Weights BMI   Starting 288#     1 Year 180# 29   2 Year 170#     3 Year 198# 31              For Adults 20 Years and Older:  BMI Weight Status   Below 18.5 Underweight   18.6-24.9 Normal/Healthy   25.0-29.9 Overweight   30.0 & Above Obese      Ideal Weight Range for Your Height: 5'7" = 121 - 158 lbs                              Pertinent History:  HTN - [] Yes   [x] No    [] Resolved  HLD - [] Yes   [x] No    [] Resolved  DM  -  [] Yes   [x] No    [] Resolved  JERMAINE - [] Yes   [x] No   [] Resolved  GERD - [] Yes   [x] No [] Resolved, mild   Anticoagulation- [] Yes   [x] No      If yes, type: [] ASA [] Plavix [] Other    Patient Care Team:  William Trejo MD as PCP - General (Family Medicine)  William Crowley MD as Surgeon (Bariatrics)  Elizabeth Jacobs " MD DYLAN as Obstetrician (Obstetrics and Gynecology)  Deny Guillaume MD (Cardiovascular Disease)  Zeeshan Matthews MD (Endocrinology)  Chai Ashley MD (Neurology)     Subjective:     12 point review of systems conducted, negative except as stated in the history of present illness. See HPI for details.    Review of patient's allergies indicates:   Allergen Reactions    Ketamine Hallucinations     Other reaction(s): Anxiety    Doxycycline Nausea And Vomiting    Gatifloxacin Itching and Rash    Moxifloxacin Itching and Rash     Past Medical History:   Diagnosis Date    Abnormal weight gain     Allergy     Tequin, Avelox, Doxycycline & Ketamine    Anemia     Arthritis     Breast pain     Chronic back pain     Concussion     Degenerative disc disease     Depression     GERD (gastroesophageal reflux disease)     History of hemorrhoids     History of nipple discharge     History of shingles     Hypothyroidism     Insulin resistance     Irritable bowel syndrome     Kidney stones     Malaise and fatigue     PCOS (polycystic ovarian syndrome)     Vitamin B 12 deficiency     Vitamin deficiency      Past Surgical History:   Procedure Laterality Date    ADENOIDECTOMY      CHOLECYSTECTOMY      COLONOSCOPY      CYSTOSCOPY      DIAGNOSTIC LAPAROSCOPY      GASTRECTOMY      HERNIA REPAIR      LAPAROSCOPIC SLEEVE GASTRECTOMY N/A 07/27/2022    Procedure: GASTRECTOMY, SLEEVE, LAPAROSCOPIC;  Surgeon: William Crowley MD;  Location: [facility];  Service: General;  Laterality: N/A;    MYRINGOTOMY W/ TUBES      ORTHOPEDIC SURGERY      TONSILLECTOMY       Family History   Problem Relation Name Age of Onset    Diabetes type II Mother Kiana Marvin     Diabetes Mother Kiana Marvin     Hypertension Mother Kiana Marvin     Heart disease Father Tye Marvin     Diabetes type II Father Tye Marvin     Sleep apnea Father Tye Marvin     Lung cancer Maternal Grandmother      Diabetes type II Maternal Grandmother      Lung  "cancer Maternal Grandfather      Stroke Paternal Grandmother Rae Marvin     Heart attack Paternal Grandmother Rae Marvin     Colon cancer Paternal Grandfather      Heart attack Paternal Grandfather      Throat cancer Other aunt     Bladder Cancer Other Aunt     Colon cancer Other aunt     Lung cancer Other uncle     Cancer Paternal Aunt Shana Teal     Hyperlipidemia Brother Jay Marvin      Social History[1]   Current Outpatient Medications   Medication Instructions    biotin 5,000 mcg Chew Take by mouth.    diphenhydramine HCl (ALLERGY MEDICATION ORAL) Take by mouth.    multivit-minerals/folic acid (CENTRUM ADULTS ORAL) Take by mouth.    UNABLE TO FIND Daily    VITAMIN B COMPLEX ORAL 1 tablet, Every morning       Objective:     Vital Signs (Most Recent):  Visit Vitals  /74   Pulse (!) 55   Ht 5' 7" (1.702 m)   Wt 89.8 kg (198 lb)   BMI 31.01 kg/m²       Physical Exam:  General:  Alert and oriented.    Respiratory:  Lungs are clear to auscultation, Respirations are non-labored, Breath sounds are equal.    Cardiovascular:  Normal rate, Regular rhythm, No murmur.    Gastrointestinal:  Soft, Non-tender, Non-distended, Normal bowel sounds        Musculoskeletal:  Normal range of motion, Normal strength.    Integumentary:  Warm, Dry, Pink.    Neurologic:  Alert, Oriented.    Psychiatric:  Cooperative.      Assessment:       ICD-10-CM ICD-9-CM   1. S/P gastric sleeve procedure  Z90.3 V45.75   2. Exercise counseling  Z71.82 V65.41   3. Dietary counseling  Z71.3 V65.3   4. Encounter for weight loss counseling  Z71.3 V65.3   5. Overweight (BMI 25.0-29.9)  E66.3 278.02   6. Low vitamin D level  R79.89 790.6       Plan:     1. S/P gastric sleeve procedure        2. Exercise counseling        3. Dietary counseling        4. Encounter for weight loss counseling        5. Overweight (BMI 25.0-29.9)        6. Low vitamin D level          - 3 mth recall to see how pt is doing with weight loss and if " she wants to come to [facility] to discuss meds.   -  goal for next time. Go back to stage 5 in diet, which is low carbohydrate stage. follow for three months. Exercise 30min a day for three days a week and then increase to five days a week.   - ok to discuss with OB to get on progesterone, testosterone, estrogen.   - gave pt information about the bariatric one a day multivitamin. Need to continue calcium supplements while on them. continue  vitamin since it contains iron. please eat before taking, it can make pt's very nauseated on empty stomach.   - Discussed possible surgical risks with patient that can occur at any point in time such as but not limited to vitamin and mineral deficiency, ulceration from smoking/NSAIDs/Anti-inflammatory medications, gallbladder disfunction, etc. If patient has any severe abd pain that is constant, nausea, vomiting, disruption of bowel movements, or has other concerns they are instructed to call the office or present to the emergency room. Pt verbalized understanding   - F/U 1 year with bariatric labs (annually)  - Continue exercising and following bariatric vitamin supplementation  - Support group attendance encouraged   - Keep routine appts with PCP/specialists as scheduled                    [1]   Social History  Tobacco Use    Smoking status: Never    Smokeless tobacco: Never   Substance Use Topics    Alcohol use: Yes     Alcohol/week: 2.0 standard drinks of alcohol     Types: 1 Cans of beer, 1 Shots of liquor per week     Comment: occasionally    Drug use: Never

## 2025-07-03 ENCOUNTER — PATIENT MESSAGE (OUTPATIENT)
Dept: SURGERY | Facility: CLINIC | Age: 50
End: 2025-07-03

## 2025-07-03 ENCOUNTER — OFFICE VISIT (OUTPATIENT)
Dept: SURGERY | Facility: CLINIC | Age: 50
End: 2025-07-03
Payer: COMMERCIAL

## 2025-07-03 VITALS
HEIGHT: 67 IN | DIASTOLIC BLOOD PRESSURE: 74 MMHG | SYSTOLIC BLOOD PRESSURE: 117 MMHG | BODY MASS INDEX: 31.08 KG/M2 | WEIGHT: 198 LBS | HEART RATE: 55 BPM

## 2025-07-03 DIAGNOSIS — Z71.3 ENCOUNTER FOR WEIGHT LOSS COUNSELING: ICD-10-CM

## 2025-07-03 DIAGNOSIS — Z71.3 DIETARY COUNSELING: ICD-10-CM

## 2025-07-03 DIAGNOSIS — R79.89 LOW VITAMIN D LEVEL: ICD-10-CM

## 2025-07-03 DIAGNOSIS — Z71.82 EXERCISE COUNSELING: ICD-10-CM

## 2025-07-03 DIAGNOSIS — E66.3 OVERWEIGHT (BMI 25.0-29.9): ICD-10-CM

## 2025-07-03 DIAGNOSIS — Z90.3 S/P GASTRIC SLEEVE PROCEDURE: Primary | ICD-10-CM

## 2025-07-03 PROCEDURE — 3078F DIAST BP <80 MM HG: CPT | Mod: CPTII,,, | Performed by: NURSE PRACTITIONER

## 2025-07-03 PROCEDURE — 3008F BODY MASS INDEX DOCD: CPT | Mod: CPTII,,, | Performed by: NURSE PRACTITIONER

## 2025-07-03 PROCEDURE — 3074F SYST BP LT 130 MM HG: CPT | Mod: CPTII,,, | Performed by: NURSE PRACTITIONER

## 2025-07-03 PROCEDURE — 99214 OFFICE O/P EST MOD 30 MIN: CPT | Mod: ,,, | Performed by: NURSE PRACTITIONER

## (undated) DEVICE — SCISSOR CURVED ENDOPATH 5MM

## (undated) DEVICE — MATTRESS HOVERMAT TRNSF 39X78

## (undated) DEVICE — DRAPE UTILITY STRL 15X26IN

## (undated) DEVICE — SUT TK QUIK LOAD

## (undated) DEVICE — ADHESIVE MASTISOL VIAL 48/BX

## (undated) DEVICE — MANIFOLD 4 PORT

## (undated) DEVICE — KIT LAPAROSCOPY UNIVERSITY

## (undated) DEVICE — GLOVE PROTEXIS HYDROGEL SZ7.5

## (undated) DEVICE — GLOVE PROTEXIS LTX MICRO 6

## (undated) DEVICE — SUT VICRYL+ 27 UR-6 VIOL

## (undated) DEVICE — SEALANT VISTASEAL FIBRIN 4ML

## (undated) DEVICE — GLOVE PROTEXIS LTX MICRO 6.5

## (undated) DEVICE — STAPLER ECHELON FLEX 60MM 44CM

## (undated) DEVICE — SUT ETHIBOND #0 EN-3 112CM

## (undated) DEVICE — BINDER AB SIZE XL 12X72-96IN

## (undated) DEVICE — TROCAR ENDOPATH XCEL 15MM 10CM

## (undated) DEVICE — APPLICATOR VISTASEAL RIG 35CM

## (undated) DEVICE — GOWN SMARTSLEEVE AAMI LVL4 LG

## (undated) DEVICE — GLOVE PROTEXIS BLUE LATEX 6.5

## (undated) DEVICE — TROCAR ENDOPATH XCEL 5X100MM

## (undated) DEVICE — NDL SAFETY 21G X 1 IN ECLIPSE

## (undated) DEVICE — GLOVE PROTEXIS LTX MICRO  7.5

## (undated) DEVICE — CANNULA ENDOPATH XCEL 5X100MM

## (undated) DEVICE — DEVICE TK TI-KNOT 5MM REPL DEV

## (undated) DEVICE — SEALANT VISTASEAL FIBRIN 10ML

## (undated) DEVICE — CLOSURE SKIN STERI STRIP 1/2X4

## (undated) DEVICE — RELOAD ECHELON FLEX GRN 60MM

## (undated) DEVICE — GLOVE PROTEXIS BLUE LATEX 7.5

## (undated) DEVICE — SOL WATER STRL IRR 1000ML

## (undated) DEVICE — CLIP ENDO LIGATION LARGE CLIPS

## (undated) DEVICE — APPLICATOR STRL COT 2INNR 6IN

## (undated) DEVICE — SUT VICRYL PLUS 4-0 FS-2 27IN

## (undated) DEVICE — SOL NORMAL USPCA 0.9%

## (undated) DEVICE — SHEARS HS LONG 5MM CURVED

## (undated) DEVICE — TROCAR ENDOPATH XCEL 11MM 10CM

## (undated) DEVICE — SEE MEDLINE ITEM 157125

## (undated) DEVICE — RELOAD ECHELON FLEX BLU 60MM

## (undated) DEVICE — GLOVE PROTEXIS PI SYN SURG 7.5

## (undated) DEVICE — HANDLE DEVON RIGID OR LIGHT

## (undated) DEVICE — GAUZE SPONGE 4X4 12PLY

## (undated) DEVICE — SYR 10CC LUER LOCK

## (undated) DEVICE — IRRIGATOR HYDRO-SURG PLUS 5MM

## (undated) DEVICE — NDL GRANEE OPEN LOOP GRASPER